# Patient Record
Sex: FEMALE | Race: WHITE | Employment: OTHER | URBAN - METROPOLITAN AREA
[De-identification: names, ages, dates, MRNs, and addresses within clinical notes are randomized per-mention and may not be internally consistent; named-entity substitution may affect disease eponyms.]

---

## 2017-01-11 ENCOUNTER — APPOINTMENT (OUTPATIENT)
Dept: RADIATION ONCOLOGY | Facility: HOSPITAL | Age: 77
End: 2017-01-11
Payer: MEDICARE

## 2017-10-01 ENCOUNTER — HOSPITAL ENCOUNTER (EMERGENCY)
Facility: HOSPITAL | Age: 77
Discharge: HOME | End: 2017-10-01
Attending: EMERGENCY MEDICINE
Payer: MEDICARE

## 2017-10-01 VITALS
BODY MASS INDEX: 23.36 KG/M2 | SYSTOLIC BLOOD PRESSURE: 128 MMHG | HEIGHT: 60 IN | TEMPERATURE: 98 F | RESPIRATION RATE: 16 BRPM | WEIGHT: 119 LBS | HEART RATE: 62 BPM | DIASTOLIC BLOOD PRESSURE: 74 MMHG | OXYGEN SATURATION: 97 %

## 2017-10-01 DIAGNOSIS — R04.0 NOSEBLEED: Primary | ICD-10-CM

## 2017-10-01 PROCEDURE — 99282 EMERGENCY DEPT VISIT SF MDM: CPT

## 2017-10-01 PROCEDURE — 99281 EMR DPT VST MAYX REQ PHY/QHP: CPT

## 2017-10-01 RX ORDER — PRAVASTATIN SODIUM 10 MG
10 TABLET ORAL
COMMUNITY
End: 2017-11-05

## 2017-10-01 NOTE — ED INITIAL ASSESSMENT (HPI)
States nose bleed at times after eating,  Last episode this am,  Previous about 3 weeks ago,  Bleeding from bilat nares

## 2017-10-01 NOTE — ED PROVIDER NOTES
Patient Seen in: [facility] Emergency Department    History   Patient presents with:  Nose Bleed (nasopharyngeal)    Stated Complaint: nose bleeds    HPI    50-year-old female presents to the emergency department complaining of having intermittent n alert and oriented ×3  HEENT: Pupils are equal reactive to light. Extra ocular motions are intact. No scleral icterus or conjunctival pallor: Neck is supple without tenderness on palpation. Head is atraumatic normocephalic.   Oral mucosa moist.  Tongue i days        Medications Prescribed:  Current Discharge Medication List

## 2017-10-26 ENCOUNTER — HOSPITAL ENCOUNTER (INPATIENT)
Facility: HOSPITAL | Age: 77
LOS: 10 days | Discharge: NURSING, REHAB OR LONG-TERM CARE | DRG: 300 | End: 2017-11-05
Attending: EMERGENCY MEDICINE | Admitting: INTERNAL MEDICINE
Payer: MEDICARE

## 2017-10-26 ENCOUNTER — APPOINTMENT (OUTPATIENT)
Dept: ULTRASOUND IMAGING | Facility: HOSPITAL | Age: 77
DRG: 300 | End: 2017-10-26
Attending: EMERGENCY MEDICINE
Payer: MEDICARE

## 2017-10-26 ENCOUNTER — APPOINTMENT (OUTPATIENT)
Dept: GENERAL RADIOLOGY | Facility: HOSPITAL | Age: 77
DRG: 300 | End: 2017-10-26
Attending: EMERGENCY MEDICINE
Payer: MEDICARE

## 2017-10-26 DIAGNOSIS — L03.115 CELLULITIS OF RIGHT LOWER EXTREMITY: Primary | ICD-10-CM

## 2017-10-26 DIAGNOSIS — I96 TOE GANGRENE (HCC): ICD-10-CM

## 2017-10-26 PROBLEM — I73.9 PVD (PERIPHERAL VASCULAR DISEASE) (HCC): Chronic | Status: ACTIVE | Noted: 2017-10-26

## 2017-10-26 PROBLEM — I10 ESSENTIAL HYPERTENSION: Chronic | Status: ACTIVE | Noted: 2017-10-26

## 2017-10-26 PROCEDURE — 93926 LOWER EXTREMITY STUDY: CPT | Performed by: EMERGENCY MEDICINE

## 2017-10-26 PROCEDURE — 99223 1ST HOSP IP/OBS HIGH 75: CPT | Performed by: INTERNAL MEDICINE

## 2017-10-26 PROCEDURE — 73630 X-RAY EXAM OF FOOT: CPT | Performed by: EMERGENCY MEDICINE

## 2017-10-26 RX ORDER — HYDROCODONE BITARTRATE AND ACETAMINOPHEN 5; 325 MG/1; MG/1
2 TABLET ORAL EVERY 4 HOURS PRN
Status: DISCONTINUED | OUTPATIENT
Start: 2017-10-26 | End: 2017-11-05

## 2017-10-26 RX ORDER — ATENOLOL 50 MG/1
50 TABLET ORAL 2 TIMES DAILY
Status: DISCONTINUED | OUTPATIENT
Start: 2017-10-26 | End: 2017-10-26

## 2017-10-26 RX ORDER — HYDROMORPHONE HYDROCHLORIDE 1 MG/ML
0.2 INJECTION, SOLUTION INTRAMUSCULAR; INTRAVENOUS; SUBCUTANEOUS EVERY 2 HOUR PRN
Status: DISCONTINUED | OUTPATIENT
Start: 2017-10-26 | End: 2017-11-05

## 2017-10-26 RX ORDER — ASPIRIN 81 MG/1
81 TABLET, CHEWABLE ORAL DAILY
Status: DISCONTINUED | OUTPATIENT
Start: 2017-10-26 | End: 2017-11-05

## 2017-10-26 RX ORDER — ATENOLOL 50 MG/1
50 TABLET ORAL 2 TIMES DAILY
Status: DISCONTINUED | OUTPATIENT
Start: 2017-10-27 | End: 2017-10-27

## 2017-10-26 RX ORDER — GABAPENTIN 600 MG/1
600 TABLET ORAL 2 TIMES DAILY
Status: DISCONTINUED | OUTPATIENT
Start: 2017-10-26 | End: 2017-11-05

## 2017-10-26 RX ORDER — MELATONIN
325
Status: DISCONTINUED | OUTPATIENT
Start: 2017-10-27 | End: 2017-11-05

## 2017-10-26 RX ORDER — HEPARIN SODIUM 5000 [USP'U]/ML
5000 INJECTION, SOLUTION INTRAVENOUS; SUBCUTANEOUS EVERY 8 HOURS SCHEDULED
Status: DISCONTINUED | OUTPATIENT
Start: 2017-10-26 | End: 2017-10-27

## 2017-10-26 RX ORDER — HYDROMORPHONE HYDROCHLORIDE 1 MG/ML
0.8 INJECTION, SOLUTION INTRAMUSCULAR; INTRAVENOUS; SUBCUTANEOUS EVERY 2 HOUR PRN
Status: DISCONTINUED | OUTPATIENT
Start: 2017-10-26 | End: 2017-11-05

## 2017-10-26 RX ORDER — ACETAMINOPHEN 325 MG/1
650 TABLET ORAL EVERY 4 HOURS PRN
Status: DISCONTINUED | OUTPATIENT
Start: 2017-10-26 | End: 2017-11-05

## 2017-10-26 RX ORDER — HYDROCODONE BITARTRATE AND ACETAMINOPHEN 5; 325 MG/1; MG/1
1 TABLET ORAL EVERY 4 HOURS PRN
Status: DISCONTINUED | OUTPATIENT
Start: 2017-10-26 | End: 2017-11-05

## 2017-10-26 RX ORDER — AMLODIPINE BESYLATE 5 MG/1
10 TABLET ORAL DAILY
Status: DISCONTINUED | OUTPATIENT
Start: 2017-10-26 | End: 2017-10-31

## 2017-10-26 RX ORDER — SENNA AND DOCUSATE SODIUM 50; 8.6 MG/1; MG/1
2 TABLET, FILM COATED ORAL DAILY PRN
Status: DISCONTINUED | OUTPATIENT
Start: 2017-10-26 | End: 2017-11-05

## 2017-10-26 RX ORDER — PANTOPRAZOLE SODIUM 20 MG/1
20 TABLET, DELAYED RELEASE ORAL
Status: DISCONTINUED | OUTPATIENT
Start: 2017-10-27 | End: 2017-11-05

## 2017-10-26 RX ORDER — HYDROMORPHONE HYDROCHLORIDE 1 MG/ML
0.4 INJECTION, SOLUTION INTRAMUSCULAR; INTRAVENOUS; SUBCUTANEOUS EVERY 2 HOUR PRN
Status: DISCONTINUED | OUTPATIENT
Start: 2017-10-26 | End: 2017-11-05

## 2017-10-26 RX ORDER — ONDANSETRON 2 MG/ML
4 INJECTION INTRAMUSCULAR; INTRAVENOUS EVERY 6 HOURS PRN
Status: DISCONTINUED | OUTPATIENT
Start: 2017-10-26 | End: 2017-11-05

## 2017-10-26 RX ORDER — CHOLESTYRAMINE LIGHT 4 G/5.7G
4 POWDER, FOR SUSPENSION ORAL 2 TIMES DAILY
Status: DISCONTINUED | OUTPATIENT
Start: 2017-10-26 | End: 2017-11-05

## 2017-10-26 RX ORDER — PENTOXIFYLLINE 400 MG/1
400 TABLET, EXTENDED RELEASE ORAL
Status: DISCONTINUED | OUTPATIENT
Start: 2017-10-26 | End: 2017-11-05

## 2017-10-26 RX ORDER — SODIUM CHLORIDE 9 MG/ML
INJECTION, SOLUTION INTRAVENOUS CONTINUOUS
Status: DISCONTINUED | OUTPATIENT
Start: 2017-10-26 | End: 2017-10-27

## 2017-10-26 RX ORDER — PRAVASTATIN SODIUM 10 MG
10 TABLET ORAL
Status: DISCONTINUED | OUTPATIENT
Start: 2017-10-27 | End: 2017-11-05

## 2017-10-26 RX ORDER — ATORVASTATIN CALCIUM 10 MG/1
10 TABLET, FILM COATED ORAL NIGHTLY
Status: DISCONTINUED | OUTPATIENT
Start: 2017-10-26 | End: 2017-10-26

## 2017-10-26 NOTE — ED NOTES
Patient assisted to bedside commode and back into bed. Right foot/leg elevated for comfort. Remains updated with plan of care. Waiting for US at this time.

## 2017-10-26 NOTE — PROGRESS NOTES
NURSING ADMISSION NOTE      Patient admitted via Cart  Oriented to room. Safety precautions initiated. Bed in low position. Call light in reach. Dr. Bryce De Dios paged for orders.

## 2017-10-26 NOTE — H&P
[facility]                                                               History & Physical         Karlee Lyon Patient Status:  Inpatient    1940 MRN [id number]   [facility] 3NW-A Attending Tanner Torres MD   [facility] Day # 0 Lisinopril                Plavix [Clopidogrel]        Home Medications:    Prescriptions Prior to Admission:  Pravastatin Sodium 10 MG Oral Tab Take 10 mg by mouth nightly. Disp:  Rfl:     aspirin 81 MG Oral Tab Take 81 mg by mouth daily.  Disp:  Rfl: in the the HPI. Physical Exam:     Vital signs: Blood pressure 95/75, pulse 69, temperature (!) 97.5 °F (36.4 °C), temperature source Temporal, resp. rate 16, height 4' 8\" (1.422 m), weight 117 lb (53.1 kg), SpO2 95 %. General: No acute distress.  Boogie color duplex Doppler ultrasound examination of the right lower extremity was performed as well as high-resolution ultrasound examination of the abdominal aorta.   Color imaging, Doppler wave form analysis, and peak systolic   flow measurements of the common (CPT=73630)     TECHNIQUE:  AP, oblique, and lateral views were obtained. COMPARISON:  None. INDICATIONS:  swelling     PATIENT STATED HISTORY: (As transcribed by Technologist)  Patient is non-diabetic with no prior surgeries and trauma.  She has co will require inpatient services that will reasonably be expected to span two midnight's based on the clinical documentation in H+P. Based on patients current state of illness, I anticipate that, after discharge, patient will require TBD.       Discussed i

## 2017-10-26 NOTE — ED NOTES
Patient waiting for xray at this time. Plan for [address] following xray. Patient remains updated with plan of care.

## 2017-10-26 NOTE — CONSULTS
INFECTIOUS DISEASE CONSULTATION    Sesar Maddoxerty Patient Status:  Inpatient    1940 MRN [id number]   [facility] 3NW-A Attending Ramon Murray MD   [facility] Day # 0 JULISSA lEias, tablet, 1 tablet, Oral, Q4H PRN **OR** HYDROcodone-acetaminophen (NORCO) 5-325 MG per tab 2 tablet, 2 tablet, Oral, Q4H PRN  •  ondansetron HCl (ZOFRAN) injection 4 mg, 4 mg, Intravenous, Q6H PRN  •  AmLODIPine Besylate (NORVASC) tab 10 mg, 10 mg, Oral, Da RDW  15.6   NEPRELIM  8.72*   WBC  11.8   PLT  254.0       Recent Labs   Lab  10/26/17   1027   GLU  101*   BUN  20   CREATSERUM  1.65*   CA  8.6   ALB  2.8*   NA  139   K  4.4   CL  117*   CO2  11.0*   ALKPHO  103   AST  21   ALT  15   BILT  0.2   TP  7

## 2017-10-26 NOTE — ED PROVIDER NOTES
Patient Seen in: [facility] Emergency Department    History   Patient presents with:  Swelling Edema (cardiovascular, metabolic)    Stated Complaint: swelling    HPI    50-year-old female who currently resides at [facility] due to inability t gangrenous right little toe. There is erythema, edema and induration to the dorsum of her foot. Onychomycosis. No lymphangitic streaking.   Slight purulence around the base of the third toe    ED Course     Labs Reviewed   COMP METABOLIC PANEL (14) - Abn visit. Follow-up:  No follow-up provider specified.     Medications Prescribed:  Current Discharge Medication List

## 2017-10-26 NOTE — ED INITIAL ASSESSMENT (HPI)
Patient reports right sided foot pain/swelling. Pain mainly to toes, most severe to 4th and 5th. Denies any injury/trauma. NH staff reports neg xray of foot.  Taking norco as needed for pain, last dose at 930 am pta

## 2017-10-26 NOTE — ED NOTES
Patient taken to [address] [facility] department at this time by PCT. Remains updated with plan for admission. Blood culture sets drawn. Waiting for antibiotic to be available for infusion.

## 2017-10-26 NOTE — ED NOTES
Patient remains out of department in [address] [facility] at this time for arterial study.

## 2017-10-27 ENCOUNTER — APPOINTMENT (OUTPATIENT)
Dept: ULTRASOUND IMAGING | Facility: HOSPITAL | Age: 77
DRG: 300 | End: 2017-10-27
Attending: INTERNAL MEDICINE
Payer: MEDICARE

## 2017-10-27 PROCEDURE — 76770 US EXAM ABDO BACK WALL COMP: CPT | Performed by: INTERNAL MEDICINE

## 2017-10-27 PROCEDURE — 99222 1ST HOSP IP/OBS MODERATE 55: CPT | Performed by: INTERNAL MEDICINE

## 2017-10-27 PROCEDURE — 99232 SBSQ HOSP IP/OBS MODERATE 35: CPT | Performed by: INTERNAL MEDICINE

## 2017-10-27 RX ORDER — POTASSIUM CHLORIDE 20 MEQ/1
40 TABLET, EXTENDED RELEASE ORAL ONCE
Status: COMPLETED | OUTPATIENT
Start: 2017-10-27 | End: 2017-10-27

## 2017-10-27 RX ORDER — HEPARIN SODIUM AND DEXTROSE 10000; 5 [USP'U]/100ML; G/100ML
12 INJECTION INTRAVENOUS ONCE
Status: COMPLETED | OUTPATIENT
Start: 2017-10-27 | End: 2017-10-27

## 2017-10-27 RX ORDER — HEPARIN SODIUM 5000 [USP'U]/ML
60 INJECTION INTRAVENOUS; SUBCUTANEOUS ONCE
Status: COMPLETED | OUTPATIENT
Start: 2017-10-27 | End: 2017-10-27

## 2017-10-27 RX ORDER — HEPARIN SODIUM AND DEXTROSE 10000; 5 [USP'U]/100ML; G/100ML
INJECTION INTRAVENOUS CONTINUOUS
Status: DISCONTINUED | OUTPATIENT
Start: 2017-10-28 | End: 2017-10-30

## 2017-10-27 NOTE — PHYSICAL THERAPY NOTE
PHYSICAL THERAPY QUICK EVALUATION - INPATIENT    Room Number: 327/327-A  Evaluation Date: 10/27/2017  Presenting Problem: R 5th toe gangrene and RLE cellulitis  Physician Order: PT Eval and Treat    Problem List  Principal Problem:    Cellulitis of right MOBILITY  How much difficulty does the patient currently have. ..  -   Turning over in bed (including adjusting bedclothes, sheets and blankets)?: None   -   Sitting down on and standing up from a chair with arms (e.g., wheelchair, bedside commode, etc.): A from moderate. Based on this evaluation, patient's clinical presentation is stable and overall evaluation complexity is considered low. Pt is currently at her baseline for mobility and has been non ambulatory x 6 yrs.  Pt is safe to perform bed mobility and

## 2017-10-27 NOTE — PLAN OF CARE
PAIN - ADULT    • Verbalizes/displays adequate comfort level or patient's stated pain goal Progressing        SKIN/TISSUE INTEGRITY - ADULT    • Incision(s), wounds(s) or drain site(s) healing without S/S of infection Progressing          Patient complaini

## 2017-10-27 NOTE — PROGRESS NOTES
NOTIFIED CARDIOLOGY CONSULTATION  THIS MORNING, NOBODY CALLED BACK , FOLLOW UP WITH CONSULTATION, DISCUSSED WITH APBOONE , SHE SAID  THEY ARE BUSY WITH PROCEDURES WILL SEE PT LATER , WILL CONTINUE TO MONITOR

## 2017-10-27 NOTE — PROGRESS NOTES
Patient resting in bed calmly. Right LE elevated on pillow and note right foot red and warm and swollen. Note 5 th toe black on right foot. All consults contacted and informed of orders.

## 2017-10-27 NOTE — OCCUPATIONAL THERAPY NOTE
OCCUPATIONAL THERAPY QUICK EVALUATION - INPATIENT    Room Number: 327/327-A  Evaluation Date: 10/27/2017     Type of Evaluation: Initial and Quick Eval  Presenting Problem: R 5th toe gangrene    Physician Order: IP Consult to Occupational Therapy  Reason f STRENGTH ASSESSMENT  Upper extremity ROM is within functional limits     Upper extremity strength is within functional limits     NEUROLOGICAL FINDINGS                   ACTIVITIES OF DAILY LIVING ASSESSMENT  AM-PAC ‘6-Clicks’ Inpatient Daily Activity Kirk Clock No comorbidities nor modifications of tasks    Clinical Decision Making  LOW - Analysis of occupational profile, problem-focused assessments, limited treatment options    Overall Complexity  LOW     OT Discharge Recommendations: Long term care       PLAN

## 2017-10-27 NOTE — CONSULTS
[facility]    Report of Consultation    Carol Ann Schultz Patient Status:  Inpatient    1940 MRN [id number]   [facility] 3NW-A Attending Anisa Agee MD   [facility] Day # 1 PCP Kathleen Knox MD     Date of Admission:  10/26/2017 mEq, Intravenous, Continuous  •  Heparin Sodium (Porcine) 5000 UNIT/ML injection 5,000 Units, 5,000 Units, Subcutaneous, Q8H SAE  •  HYDROmorphone HCl PF (DILAUDID) 1 MG/ML injection 0.2 mg, 0.2 mg, Intravenous, Q2H PRN **OR** HYDROmorphone HCl PF (DILAUDI not on telemetry  General: Alert and oriented in no apparent distress. HEENT: No focal deficits. Neck: No JVD  Cardiac: Regular rate and rhythm, FRANK  Lungs: Clear without wheezes, rales, rhonchi or dullness. Normal excursions and effort. Hyperinflated. Gerson Diaz  10/27/2017  5:34 PM

## 2017-10-27 NOTE — CONSULTS
[facility]  Report of Consultation    Etelvina Santana Patient Status:  Inpatient    1940 MRN [id number]   [facility] 3NW-A Attending Malik Lundy MD   [facility] Day # 1 PCP Mario Haider MD     Date of Admission:  10/26/2017  Date Intravenous, Q2H PRN  •  acetaminophen (TYLENOL) tab 650 mg, 650 mg, Oral, Q4H PRN **OR** HYDROcodone-acetaminophen (NORCO) 5-325 MG per tab 1 tablet, 1 tablet, Oral, Q4H PRN **OR** HYDROcodone-acetaminophen (NORCO) 5-325 MG per tab 2 tablet, 2 tablet, Jeromy Patch common femoral   artery. A CTA could be obtained to assess the iliac arteries as clinically appropriate. Vascular surgery consultation is recommended.          Dictated by: Laney Riggs MD on 10/26/2017 at 13:09       Approved by: Laney Riggs MD

## 2017-10-27 NOTE — CM/SW NOTE
MSW reviewed chart for d/c needs. MSW spoke to Admissions at [facility], patient is long term care there under [address]. POA-HC form in EMR for son Jazmine Ma.

## 2017-10-27 NOTE — CONSULTS
[facility]  Report of Consultation    Ligia Oliver Patient Status:  Inpatient    1940 MRN [id number]   [facility] 3NW-A Attending Summer Hightower MD   Hosp Day # 1 PCP Ole Camejo MD     Reason for Consultation:  CKD 3    H PRN  •  acetaminophen (TYLENOL) tab 650 mg, 650 mg, Oral, Q4H PRN **OR** HYDROcodone-acetaminophen (NORCO) 5-325 MG per tab 1 tablet, 1 tablet, Oral, Q4H PRN **OR** HYDROcodone-acetaminophen (NORCO) 5-325 MG per tab 2 tablet, 2 tablet, Oral, Q4H PRN  •  on scleral icterus, MMM  Neck: Supple, no COURTNEY or thyromegaly  Cardiac: Regular rate and rhythm, S1, S2 normal, 1/6 FRANK  Lungs: Clear without wheezes, rales, rhonchi.     Abdomen: Soft, non-tender. + bowel sounds, no palpable organomegaly  Extremities:  R foot 1-5% or so at most.  Will follow    #2. R 5th toe gangrene- will need vascular eval    #3. HTN- cont amlodipine    #4. Metabolic acidosis- may be due to ischemia/tissue damage.   will change to bicarb gtt        Thank you for allowing me to participate i

## 2017-10-27 NOTE — PLAN OF CARE
Per  patient follows Evelin Hu from [facility]iology, Betty Vazquez notified, order received to consult [facility]. Bette Jimenez on call and paged, will await response.

## 2017-10-27 NOTE — PROGRESS NOTES
[facility]  Progress Note    Yamila Self Patient Status:  Inpatient    1940 MRN [id number]   [facility] 3NW-A Attending Tasia Fleming MD   Hosp Day # 1 PCP Brittany Slade MD     CC:  Intractable right foot swelling and pain cellulitis of the right foot with erythema and mild swelling. Right foot toes itself is colder than the rest of the body to touch. Exam same as yesterday  Psychiatric: Appropriate mood and affect.       Labs:     Lab Results  Component Value Date   WBC 8.1 tablet 2 tablet Oral Daily PRN   CeFAZolin in isotonic solution (ANCEF) 1 GM/50ML IVPB premix 1 g 1 g Intravenous Q12H   atenolol (TENORMIN) tab 50 mg 50 mg Oral BID       ASSESSMENT / PLAN:     1.  Right foot fifth toe gangrene with right foot cellulitis w

## 2017-10-28 PROCEDURE — 99232 SBSQ HOSP IP/OBS MODERATE 35: CPT | Performed by: INTERNAL MEDICINE

## 2017-10-28 RX ORDER — CHLORHEXIDINE GLUCONATE 4 G/100ML
30 SOLUTION TOPICAL DAILY
Status: DISCONTINUED | OUTPATIENT
Start: 2017-10-28 | End: 2017-11-03

## 2017-10-28 NOTE — CONSULTS
[facility]    PATIENT'S NAME: Karine Salazar   ATTENDING PHYSICIAN: Austyn Hurtado M.D.   CONSULTING PHYSICIAN: Radha Potter M.D.    PATIENT ACCOUNT#:   [de-identified]    LOCATION:  [address] [place] [address]  MEDICAL RECORD #:   [id number]       DATE OF BIRTH:  [birth year] Her labs show her creatinine 1.66, BUN 20. Her last white count was 11,000, hemoglobin was 9.6, platelet count is 277,759. She has blood flow study showing significant disease of the lower extremity.  Monophasic flow seen all the way distally from the f

## 2017-10-28 NOTE — PROGRESS NOTES
[facility]  Progress Note    Ligia Oliver Patient Status:  Inpatient    1940 MRN [id number]   [facility] 3NW-A Attending Summer Hightower MD   Hosp Day # 2 PCP Ole Camejo MD     CC:  Intractable right foot swelling and pain yesterday. Integument: Right foot fifth toe wet gangrene with surrounding cellulitis of the right foot with erythema and mild swelling. Right foot toes itself is colder than the rest of the body to touch. 2 nd toe tip discolored with decreased blanching breakfast   gabapentin (NEURONTIN) tab 600 mg 600 mg Oral BID   Pantoprazole Sodium (PROTONIX) EC tab 20 mg 20 mg Oral QAM AC   Pentoxifylline ER (TRENTAL) CR tab 400 mg 400 mg Oral TID CC   Pravastatin Sodium (PRAVACHOL) tab 10 mg 10 mg Oral Once per day

## 2017-10-28 NOTE — PROGRESS NOTES
[facility]                INFECTIOUS DISEASE PROGRESS NOTE    Deepika Rob Patient Status:  Inpatient    1940 MRN [id number]   [facility] 3NW-A Attending Stephen Chaudhry MD   Hosp Day # 2 PCP MD Belem [place] Culture Result No Growth 1 Day   Blood Culture FREQ X 2 [[id number]] Collected: 10/26/17 1140   Order Status: Completed Lab Status: Preliminary result Updated: 10/27/17 1200   Specimen: Blood from Blood,peripheral     Blood Culture Result No Growth 1 Day

## 2017-10-28 NOTE — PROGRESS NOTES
[facility]  Nephrology Progress Note    Gretta Hinojosa Attending:  Cynthia Ziegler MD       Assessment and Plan:    1) CKD 3- slow progression of renal dysfunction is due to previously \"undiagnosed\" PKD +/- hypertensive and age-related nephroscleros CO2 18.0 10/28/2017    10/28/2017   CA 7.5 10/28/2017   PTT 78.5 10/28/2017   MG 2.2 10/28/2017       Imaging: All imaging studies reviewed.     Meds:     Current Facility-Administered Medications:  mupirocin (BACTROBAN) 2% nasal ointment OINT 1 A PM

## 2017-10-28 NOTE — PLAN OF CARE
Incision(s), wounds(s) or drain site(s) healing without S/S of infection Not Progressing      Achieve highest/safest level of mobility/gait Progressing      Verbalizes/displays adequate comfort level or patient's stated pain goal Progressing      Patient/F

## 2017-10-29 ENCOUNTER — APPOINTMENT (OUTPATIENT)
Dept: CV DIAGNOSTICS | Facility: HOSPITAL | Age: 77
DRG: 300 | End: 2017-10-29
Attending: INTERNAL MEDICINE
Payer: MEDICARE

## 2017-10-29 PROCEDURE — 93306 TTE W/DOPPLER COMPLETE: CPT | Performed by: INTERNAL MEDICINE

## 2017-10-29 PROCEDURE — 99232 SBSQ HOSP IP/OBS MODERATE 35: CPT | Performed by: INTERNAL MEDICINE

## 2017-10-29 RX ORDER — SODIUM CHLORIDE 9 MG/ML
INJECTION, SOLUTION INTRAVENOUS CONTINUOUS
Status: DISCONTINUED | OUTPATIENT
Start: 2017-10-29 | End: 2017-10-31

## 2017-10-29 RX ORDER — PENTOXIFYLLINE 400 MG/1
400 TABLET, EXTENDED RELEASE ORAL
Status: COMPLETED | OUTPATIENT
Start: 2017-10-29 | End: 2017-10-29

## 2017-10-29 NOTE — PROGRESS NOTES
· [facility] Cardiology Progress Note     Subjective:  Pain in right foot, no chest pain or dyspnea    Objective:  SBP 90s  SB/SR  I/O + 2027  BUN/cr 14/1.25 10/28  plt 230K    Cardiac:S1 s2 regular  Lungs: clear decreased BS  Abdomen:soft  Extremities:n

## 2017-10-29 NOTE — PROGRESS NOTES
[facility]  Progress Note    Moreno Baumann Patient Status:  Inpatient    1940 MRN [id number]   [facility] 3NW-A Attending Darrell Morales MD   Hosp Day # 3 PCP Parris Dubin, MD     CC:  Intractable right foot swelling and pain popliteal pulse present,same as yesterday. Intractable right foot  discolored bluish black fifth toe same as yesterday, 2 nd toe tip discolored with decreased blanching same, foot and toes with less erythema, more pink in color with better capillary refill Daily   cholestyramine light (PREVALITE) powder packet 4 g 4 g Oral BID   ferrous sulfate EC tab 325 mg 325 mg Oral Daily with breakfast   gabapentin (NEURONTIN) tab 600 mg 600 mg Oral BID   Pantoprazole Sodium (PROTONIX) EC tab 20 mg 20 mg Oral QAM AC   P

## 2017-10-29 NOTE — PROGRESS NOTES
Fifth toe on right foot black and painful. Second toe on right foot red this AM, when reassessed in the afternoon tip of the toe is starting to turn black. BP dropped to 89/61, HR 48. Pt denies any shortness of breath, chest pain, or lightheadedness.  Al

## 2017-10-30 ENCOUNTER — APPOINTMENT (OUTPATIENT)
Dept: INTERVENTIONAL RADIOLOGY/VASCULAR | Facility: HOSPITAL | Age: 77
DRG: 300 | End: 2017-10-30
Attending: INTERNAL MEDICINE
Payer: MEDICARE

## 2017-10-30 PROCEDURE — 99232 SBSQ HOSP IP/OBS MODERATE 35: CPT | Performed by: INTERNAL MEDICINE

## 2017-10-30 PROCEDURE — 04JY3ZZ INSPECTION OF LOWER ARTERY, PERCUTANEOUS APPROACH: ICD-10-PCS | Performed by: INTERNAL MEDICINE

## 2017-10-30 RX ORDER — LIDOCAINE HYDROCHLORIDE 10 MG/ML
INJECTION, SOLUTION INFILTRATION; PERINEURAL
Status: COMPLETED
Start: 2017-10-30 | End: 2017-10-30

## 2017-10-30 RX ORDER — POTASSIUM CHLORIDE 20 MEQ/1
40 TABLET, EXTENDED RELEASE ORAL EVERY 4 HOURS
Status: COMPLETED | OUTPATIENT
Start: 2017-10-30 | End: 2017-10-30

## 2017-10-30 RX ORDER — SODIUM CHLORIDE 9 MG/ML
INJECTION, SOLUTION INTRAVENOUS CONTINUOUS
Status: ACTIVE | OUTPATIENT
Start: 2017-10-30 | End: 2017-10-30

## 2017-10-30 RX ORDER — MIDAZOLAM HYDROCHLORIDE 1 MG/ML
INJECTION INTRAMUSCULAR; INTRAVENOUS
Status: DISCONTINUED
Start: 2017-10-30 | End: 2017-10-30 | Stop reason: WASHOUT

## 2017-10-30 RX ORDER — MIDAZOLAM HYDROCHLORIDE 1 MG/ML
INJECTION INTRAMUSCULAR; INTRAVENOUS
Status: COMPLETED
Start: 2017-10-30 | End: 2017-10-30

## 2017-10-30 RX ORDER — HEPARIN SODIUM 5000 [USP'U]/ML
INJECTION, SOLUTION INTRAVENOUS; SUBCUTANEOUS
Status: COMPLETED
Start: 2017-10-30 | End: 2017-10-30

## 2017-10-30 NOTE — CM/SW NOTE
10/30/17 1100   CM/SW Referral Data   Referral Source Physician;Social Work (self-referral)   Reason for Referral Discharge planning   Informant Patient; Children   Pertinent Medical Hx   Primary Care Physician Name Dot Carmen 60   Patient Info   Patient's Ment

## 2017-10-30 NOTE — PROGRESS NOTES
Patient left the unit at this time in stable condition to the cath lab. Consent is on chart, unsigned per patient request, she still has some questions. IV fluids infusing, bag of   .9% NS with tubing primed hanging on IV pole.  Heparin gtt infusing, new ba

## 2017-10-30 NOTE — PROGRESS NOTES
[facility]  Nephrology Progress Note    Luke Bowens Patient Status:  Inpatient    1940 MRN [id number]   [facility] 3NW-A Attending Mathew Strong MD   Hosp Day # 4 PCP Senthil Jo MD       SUBJECTIVE:  No complaints today last 72 hours.     Meds:     Current Facility-Administered Medications:  0.9%  NaCl infusion  Intravenous Continuous   mupirocin (BACTROBAN) 2% nasal ointment OINT 1 Application 1 Application Each Nare BID   Chlorhexidine Gluconate (HIBICLENS) 4 % liquid 30 today    #3. Hypokalemia- replace    #4. HTN- BP stable    #5. Met acidosis-due to ischemia in setting of CKD. Stable      Questions/concerns were discussed with patient and/or family by bedside.           Patricia Saucedo  10/30/2017  8:29 AM

## 2017-10-30 NOTE — PROGRESS NOTES
[facility]  Progress Note    Luis E Valverde Patient Status:  Inpatient    1940 MRN [id number]   [facility] 3NW-A Attending Nikolay Borden MD   Hosp Day # 4 PCP Shayy Sol MD     CC:  Intractable right foot swelling and pain pulse present,same as yesterday. Intractable right foot  discolored bluish black fifth toe same as yesterday, 2 nd toe tip discolored with decreased blanching same, foot and toes with less erythema, more pink in color with better capillary refill seen in bi Oral Q4H PRN   Or      HYDROcodone-acetaminophen (NORCO) 5-325 MG per tab 2 tablet 2 tablet Oral Q4H PRN   ondansetron HCl (ZOFRAN) injection 4 mg 4 mg Intravenous Q6H PRN   AmLODIPine Besylate (NORVASC) tab 10 mg 10 mg Oral Daily   aspirin chewable tab 81 TBD      Questions/concerns and Plan of care were discussed with patient ; Had discussed with son at bedside.             Tahir Lopes MD  10/30/2017

## 2017-10-30 NOTE — PROGRESS NOTES
Paged Dr. Joni Garcia regarding potassium level of 2.8. Awaiting call back. Will continue to monitor. Spoke with Dr. Drew Zhou, he states he will order the electrolyte protocol.

## 2017-10-30 NOTE — PROGRESS NOTES
10/30/17  0836   BP: 101/73   Pulse: 67   Resp: 18   Temp: 97.7 °F (36.5 °C)   Patient was seen resting comfortably in bed her son Pablo Richards was present. Her last temperature shows 98.9 she is stable she is afebrile.     Objective:  The patient's feet were exam

## 2017-10-30 NOTE — PROGRESS NOTES
[facility]                INFECTIOUS DISEASE PROGRESS NOTE    Travis Skyler Patient Status:  Inpatient    1940 MRN [id number]   [facility] 3NW-A Attending Dwayne Aj MD   Hosp Day # 4 PCP MD Funmilayo Baxter 10/26/17 1140   Order Status: Completed Lab Status: Preliminary result Updated: 10/29/17 1200   Specimen: Blood from Blood,peripheral     Blood Culture Result No Growth 3 Days   MRSA Culture Only Once [[id number]] (Abnormal) Collected: 10/26/17 1630   Order

## 2017-10-30 NOTE — PROCEDURES
[facility]    PATIENT'S NAME: Karine Salazar   ATTENDING PHYSICIAN: Austyn Hurtado M.D. OPERATING PHYSICIAN: Prasanth Hernández M.D.    PATIENT ACCOUNT#:   [de-identified]    LOCATION:  [address] [place] [address]  MEDICAL RECORD #:   [id number]       DATE OF BIRTH:

## 2017-10-31 ENCOUNTER — APPOINTMENT (OUTPATIENT)
Dept: CT IMAGING | Facility: HOSPITAL | Age: 77
DRG: 300 | End: 2017-10-31
Attending: INTERNAL MEDICINE
Payer: MEDICARE

## 2017-10-31 PROCEDURE — 5A2204Z RESTORATION OF CARDIAC RHYTHM, SINGLE: ICD-10-PCS | Performed by: INTERNAL MEDICINE

## 2017-10-31 PROCEDURE — 75635 CT ANGIO ABDOMINAL ARTERIES: CPT | Performed by: INTERNAL MEDICINE

## 2017-10-31 PROCEDURE — 99232 SBSQ HOSP IP/OBS MODERATE 35: CPT | Performed by: INTERNAL MEDICINE

## 2017-10-31 RX ORDER — HYDRALAZINE HYDROCHLORIDE 20 MG/ML
10 INJECTION INTRAMUSCULAR; INTRAVENOUS EVERY 6 HOURS PRN
Status: DISCONTINUED | OUTPATIENT
Start: 2017-10-31 | End: 2017-11-05

## 2017-10-31 RX ORDER — ZOLPIDEM TARTRATE 5 MG/1
5 TABLET ORAL NIGHTLY PRN
Status: DISCONTINUED | OUTPATIENT
Start: 2017-10-31 | End: 2017-11-05

## 2017-10-31 RX ORDER — ONDANSETRON 2 MG/ML
4 INJECTION INTRAMUSCULAR; INTRAVENOUS EVERY 6 HOURS PRN
Status: DISCONTINUED | OUTPATIENT
Start: 2017-10-31 | End: 2017-10-31

## 2017-10-31 RX ORDER — AMLODIPINE BESYLATE 5 MG/1
5 TABLET ORAL DAILY
Status: DISCONTINUED | OUTPATIENT
Start: 2017-10-31 | End: 2017-11-02

## 2017-10-31 RX ORDER — POTASSIUM CHLORIDE 20 MEQ/1
40 TABLET, EXTENDED RELEASE ORAL EVERY 4 HOURS
Status: COMPLETED | OUTPATIENT
Start: 2017-10-31 | End: 2017-11-01

## 2017-10-31 NOTE — PLAN OF CARE
Assumed care at [phone]. Transferred from cath lab to [address] Unsuccessful angiogram for limb ischemia. Left femoral artery site covered with 2x2 and tegaderm. Site soft to touch, no hematoma. Flat for 1hr post transfer.  Patient voided 2x prior to elevating HOB

## 2017-10-31 NOTE — PROGRESS NOTES
No problems overnight. Seems generally miserable between chronic back pain and foot. Reviewed with Dr. Annie Schultz last evening. No bleeding from left groin overnight.     Right second toe actually looks better to my eye than last week -- less swelling and

## 2017-10-31 NOTE — PROGRESS NOTES
[facility]                INFECTIOUS DISEASE PROGRESS NOTE    Yamila Self Patient Status:  Inpatient    1940 MRN [id number]   [facility] 3NW-A Attending Tasia Fleming MD   Hosp Day # 5 PCP MD Joy Lyons Days   MRSA Culture Only Once [[id number]] (Abnormal) Collected: 10/26/17 1630   Order Status: Completed Lab Status: Final result Updated: 10/27/17 2225   Specimen: Other from Nares     Mrsa Culture 1+ growth Staphylococcus aureus, MRSA (A)         Problem

## 2017-10-31 NOTE — PROGRESS NOTES
[facility]  Progress Note    Adrianna Cope Patient Status:  Inpatient    1940 MRN [id number]   [facility] 3NW-A Attending Charlotte Malloy MD   Hosp Day # 5 PCP Julien Taveras MD     CC:  Intractable right foot swelling and pain pulse present,same as yesterday. Intractable right foot  discolored bluish black fifth toe same as yesterday, 2 nd toe tip discolored with decreased blanching same, foot and toes with less erythema, more pink in color with better capillary refill seen in bi There was moderate regurgitation. 8. Moderately elevated pulmonary pressures in high 40&apos;s mmHg. Impressions:  No previous study was available for comparison.   *      Addendum: CTA results were pending at the time and I saw patient and discussed with the right leg. There is three-vessel runoff to the right leg.      Left side:  Proximal left common iliac artery, distally, 60%  Left external iliac artery 100%, image 95 series 6 where there is a focal absence of blood flow in this vessel, but with reconst femoral head, chronic heterotopic bone around the joint and thickening of the joint space.  Old left pubic rami fractures.     =====  CONCLUSION:  Atherosclerotic stenoses bilaterally, including occlusions with reconstitution, including occlusion of the rig Sodium (PRAVACHOL) tab 10 mg 10 mg Oral Once per day on Mon Wed Fri   Senna-Docusate Sodium (SENOKOT S) 8.6-50 MG tab 2 tablet 2 tablet Oral Daily PRN   CeFAZolin in isotonic solution (ANCEF) 1 GM/50ML IVPB premix 1 g 1 g Intravenous Q12H       ASSESSMENT

## 2017-11-01 ENCOUNTER — APPOINTMENT (OUTPATIENT)
Dept: CT IMAGING | Facility: HOSPITAL | Age: 77
DRG: 300 | End: 2017-11-01
Attending: INTERNAL MEDICINE
Payer: MEDICARE

## 2017-11-01 PROCEDURE — 99232 SBSQ HOSP IP/OBS MODERATE 35: CPT | Performed by: HOSPITALIST

## 2017-11-01 PROCEDURE — 99232 SBSQ HOSP IP/OBS MODERATE 35: CPT | Performed by: INTERNAL MEDICINE

## 2017-11-01 PROCEDURE — 71250 CT THORAX DX C-: CPT | Performed by: INTERNAL MEDICINE

## 2017-11-01 RX ORDER — HEPARIN SODIUM AND DEXTROSE 10000; 5 [USP'U]/100ML; G/100ML
INJECTION INTRAVENOUS CONTINUOUS
Status: DISCONTINUED | OUTPATIENT
Start: 2017-11-01 | End: 2017-11-05

## 2017-11-01 RX ORDER — HEPARIN SODIUM 5000 [USP'U]/ML
60 INJECTION INTRAVENOUS; SUBCUTANEOUS ONCE
Status: COMPLETED | OUTPATIENT
Start: 2017-11-01 | End: 2017-11-01

## 2017-11-01 RX ORDER — HEPARIN SODIUM AND DEXTROSE 10000; 5 [USP'U]/100ML; G/100ML
12 INJECTION INTRAVENOUS ONCE
Status: COMPLETED | OUTPATIENT
Start: 2017-11-01 | End: 2017-11-01

## 2017-11-01 RX ORDER — MAGNESIUM OXIDE 400 MG (241.3 MG MAGNESIUM) TABLET
800 TABLET ONCE
Status: COMPLETED | OUTPATIENT
Start: 2017-11-01 | End: 2017-11-01

## 2017-11-01 NOTE — PROGRESS NOTES
Seen and examined. Reviewed with patient and her son Megan Pompa at the bedside. She is in fairly good spirits this am. Comfortable supine in bed. Foot not bothering her.     Tele with sinus mechanism and frequent PAC's  -- on IV amiodarone    Lungs clear  Ht RR

## 2017-11-01 NOTE — PLAN OF CARE
AT 18:20 PT HAD 30 BTS OF VTAC,  ASYMPTOMATIC /54  WHILE CALLING CARDIOLOGY PT THEN WENT INTO SVT HR >200  BP 60 SYS,. 0.9 NS WIDE OPEN RAPID RESPONSE INITIATED. PT CARDIOVERTED BACK INTO SR. AT BEDSIDE. AMIODARONE GTT STARTED AT 1930.  PTS BP REMAINS

## 2017-11-01 NOTE — PROGRESS NOTES
Called for rapid response for rapid heart rate up to 190s with low BP. ECG showed AF with RVR. IV Amiodarone and brevital with subsequent DCCV to SR. BP improved but remained low likely secondary to amiodarone. IVF given.  Pt remained asymptomatic, no chest

## 2017-11-01 NOTE — PLAN OF CARE
PAIN - ADULT    • Verbalizes/displays adequate comfort level or patient's stated pain goal Progressing        Patient/Family Goals    • Patient/Family Long Term Goal Progressing    • Patient/Family Short Term Goal Progressing          Patient is alert and

## 2017-11-01 NOTE — PROGRESS NOTES
[facility]  Nephrology Progress Note    Sesar Gini Patient Status:  Inpatient    1940 MRN [id number]   [facility] 3NW-A Attending Ramon Murray MD   Hosp Day # 6 PCP Vijaya Elias MD       SUBJECTIVE:  Notes/events review Recent Labs   Lab  10/31/17   1857   ALT  <6*   AST  22   ALB  2.3*       Recent Labs   Lab  10/31/17   1846   PGLU  130*       Meds:     Current Facility-Administered Medications:  magnesium sulfate IVPB premix 4 g 4 g Intravenous Once   Heparin Sod (PRAVACHOL) tab 10 mg 10 mg Oral Once per day on Mon Wed Fri   Senna-Docusate Sodium (SENOKOT S) 8.6-50 MG tab 2 tablet 2 tablet Oral Daily PRN   CeFAZolin in isotonic solution (ANCEF) 1 GM/50ML IVPB premix 1 g 1 g Intravenous Q12H         Impression/Plan:

## 2017-11-01 NOTE — CONSULTS
[facility]  Report of Consultation    Sesar Rubalcava Patient Status:  Inpatient    1940 MRN [id number]   [facility] 7NE-A Attending Edd Carl MD   Hosp Day # 6 PCP Vijaya Elias MD     Reason for Consultation: Nodule/s drugs.    Medications:    Prescriptions Prior to Admission:  Pravastatin Sodium 10 MG Oral Tab Take 10 mg by mouth 3 (three) times a week. Monday, Wednesday, Friday  Disp:  Rfl:    aspirin 81 MG Oral Tab Take 81 mg by mouth daily.  Disp:  Rfl:    ferrous slade unremarkable except as mentioned above.     Vital signs:  BP 98/70 (BP Location: Right arm)   Pulse 81   Temp 98.7 °F (37.1 °C) (Oral)   Resp 16   Ht 4' 8\" (1.422 m)   Wt 120 lb (54.4 kg)   SpO2 100%   BMI 26.90 kg/m²     Intake/Output:    Intake/Output Morgan 7.4*   --    NA  144   --   142  140   --    K  2.8*   < >  4.2  3.4*  4.5   CL  106   --   102  100*   --    CO2  31.0   --   34.0*  31.0   --     < > = values in this interval not displayed.      No results for input(s): ABGPHT, FBAUOB0Z, EEALS5M, ABGHCO

## 2017-11-01 NOTE — PROGRESS NOTES
[facility]  Progress Note    Adrianna Cope Patient Status:  Inpatient    1940 MRN [id number]   [facility] 3NW-A Attending Charlotte Malloy MD   Hosp Day # 6 PCP Julien Taveras MD     CC:  Intractable right foot swelling and pain refill seen in big toe tip and toes warmer today compared to yesterday  Integument: Right foot fifth toe wet gangrene with  mild swelling.   Intractable right foot  discolored bluish black fifth toe same as yesterday, 2 nd toe tip discolored with decreased pressures in high 40&apos;s mmHg. Impressions:  No previous study was available for comparison.   *      Addendum: CTA results were pending at the time and I saw patient and discussed with the patient's son  CTA ABD PEL MIMI LEG RUNOFF DIAPH TO TOES IV CONT side:  Proximal left common iliac artery, distally, 60%  Left external iliac artery 100%, image 95 series 6 where there is a focal absence of blood flow in this vessel, but with reconstitution immediately distal.  Left common femoral artery 100% on image 1 of the joint space.  Old left pubic rami fractures.     =====  CONCLUSION:  Atherosclerotic stenoses bilaterally, including occlusions with reconstitution, including occlusion of the right distal femoral artery extending into proximal and mid popliteal with HYDROcodone-acetaminophen (NORCO) 5-325 MG per tab 2 tablet 2 tablet Oral Q4H PRN   ondansetron HCl (ZOFRAN) injection 4 mg 4 mg Intravenous Q6H PRN   aspirin chewable tab 81 mg 81 mg Oral Daily   cholestyramine light (PREVALITE) powder packet 4 g 4 g Or Clinical progress  At this point Ms. Butch Gonzalez  is expected to be discharge to: TBD      Questions/concerns and Plan of care were discussed with patient     Patel Machuca M.D.   Lisy Rice

## 2017-11-02 PROCEDURE — 99232 SBSQ HOSP IP/OBS MODERATE 35: CPT | Performed by: HOSPITALIST

## 2017-11-02 RX ORDER — AMIODARONE HYDROCHLORIDE 200 MG/1
400 TABLET ORAL 2 TIMES DAILY WITH MEALS
Status: DISCONTINUED | OUTPATIENT
Start: 2017-11-02 | End: 2017-11-03

## 2017-11-02 NOTE — PROGRESS NOTES
[facility]                INFECTIOUS DISEASE PROGRESS NOTE    Omaha Daisy Patient Status:  Inpatient    1940 MRN [id number]   [facility] 3NW-A Attending Sergey Acevedo MD   Hosp Day # 7 PCP MD Cristopher Jones [place] [employer]    Problem list reviewed:  Patient Active Problem List:     Cellulitis of right lower extremity     Toe gangrene (HCC)     Essential hypertension     PVD (peripheral vascular disease) (HCC)     CKD (chronic kidney disease) stage 3, GFR 30-59 ml/min

## 2017-11-02 NOTE — PLAN OF CARE
PT VERY ANXIOUS RE RESULTS OF CT SCAN. ASKING RN FOR RESULTS. WAITING ON DR. LONG GTT DC'D PO STARTED. SON AT BEDSIDE.

## 2017-11-02 NOTE — PLAN OF CARE
Assumed care at [address]. AOx4, VSS. No signs of cardiac or respiratory distress noted or reported. Heparin gtt infusing at 3cc/h  Pedal pulses by doppler  Due medications given, needs attended, will continue to monitor.     Impaired Functional Mobility

## 2017-11-02 NOTE — PROGRESS NOTES
[facility]  Cardiology Progress Note    Filemon Monroe Patient Status:  Inpatient    1940 MRN [id number]   [facility] 7NE-A Attending Aicha Vasques MD   Hosp Day # 7 PCP Giuliano Garrison MD     Subjective:  No complaints of kandis Chlorhexidine Gluconate  30 mL Topical Daily   • aspirin  81 mg Oral Daily   • cholestyramine light  4 g Oral BID   • ferrous sulfate  325 mg Oral Daily with breakfast   • gabapentin  600 mg Oral BID   • Pantoprazole Sodium  20 mg Oral QAM AC   • Pentoxify

## 2017-11-02 NOTE — PROGRESS NOTES
[facility]                INFECTIOUS DISEASE PROGRESS NOTE    Ankush Claros Patient Status:  Inpatient    1940 MRN [id number]   [facility] 3NW-A Attending Nereida Roper MD   Hosp Day # 6 PCP MD Braden Figueroa 1200   Specimen: Blood from Blood,peripheral     Blood Culture Result No Growth 3 Days   MRSA Culture Only Once [[id number]] (Abnormal) Collected: 10/26/17 1630   Order Status: Completed Lab Status: Final result Updated: 10/27/17 2225   Specimen: Other from

## 2017-11-02 NOTE — PROGRESS NOTES
[facility]  Progress Note    Gaurav Rowley Patient Status:  Inpatient    1940 MRN [id number]   [facility] 7NE-A Attending Jackie Lindquist MD   Hosp Day # 7 PCP Susan Rouse MD     Subjective:  Gaurav Rowley is a(n) 65 year acidosis    Assessment and Plan:     · Enlarging nodular lesion of the right middle lobe with a negative PET scan in the past-this raises concern for an enlarging adenocarcinoma. CT chest reviewed with no significant adenopathy and no other lesions noted.

## 2017-11-02 NOTE — PROGRESS NOTES
Right  foot gangrene with right  iliofemoral/sfa/tibial vessel occlusive disease with right middle lobe nodule suspicious for cancer. Discussed with Dr. Dawson Mcburney- plan revascularization- but needs medical clearance.  Possible right  iliofemoral endarterectom

## 2017-11-02 NOTE — PROGRESS NOTES
[facility]  Progress Note    Rni Wellington Patient Status:  Inpatient    1940 MRN [id number]   [facility] 3NW-A Attending Randal Espinosa MD   Hosp Day # 7 PCP Mario Saavedra MD     CC:  Intractable right foot swelling and pain swelling.   Intractable right foot  discolored bluish black fifth toe same as yesterday, 2 nd toe tip discolored with decreased blanching same, foot and toes with less erythema, more pink in color with better capillary refill seen in big toe tip and toes wa COMPARISON:  None.      INDICATIONS:  pvd     TECHNIQUE:  CT images of the abdomen, pelvis, and lower extremities were obtained pre- and post- injection of non-ionic intravenous contrast material. Multi-planar reformatted/3-D images were created to optimi a focal absence of blood flow in this vessel, but with reconstitution immediately distally.   Left femoral artery 80% image 216, but compared with the right side, this vessel on the left never becomes completely occluded with continuous blood flow throughou distal vascularity. On the left, stenoses   and focal occlusions of iliac vasculature, and significant stenoses without occlusion of lower extremity arterial vasculature. Percentages are given above.  Other nonvascular findings, extensive, are given above a Once per day on Mon Wed Fri   Senna-Docusate Sodium (SENOKOT S) 8.6-50 MG tab 2 tablet 2 tablet Oral Daily PRN   CeFAZolin in isotonic solution (ANCEF) 1 GM/50ML IVPB premix 1 g 1 g Intravenous Q12H       ASSESSMENT / PLAN:     1.  Right foot fifth toe gang

## 2017-11-03 ENCOUNTER — APPOINTMENT (OUTPATIENT)
Dept: ULTRASOUND IMAGING | Facility: HOSPITAL | Age: 77
DRG: 300 | End: 2017-11-03
Attending: SURGERY
Payer: MEDICARE

## 2017-11-03 PROCEDURE — 93970 EXTREMITY STUDY: CPT | Performed by: SURGERY

## 2017-11-03 PROCEDURE — 99232 SBSQ HOSP IP/OBS MODERATE 35: CPT | Performed by: HOSPITALIST

## 2017-11-03 PROCEDURE — 93926 LOWER EXTREMITY STUDY: CPT | Performed by: SURGERY

## 2017-11-03 RX ORDER — AMIODARONE HYDROCHLORIDE 200 MG/1
200 TABLET ORAL 2 TIMES DAILY WITH MEALS
Status: DISCONTINUED | OUTPATIENT
Start: 2017-11-03 | End: 2017-11-05

## 2017-11-03 NOTE — CONSULTS
[facility]    PATIENT'S NAME: [facility]   ATTENDING PHYSICIAN: MICHAEL Tovar: Yvon Yost M.D.    PATIENT ACCOUNT#:   [de-identified]    LOCATION:  [address] [place] [address]  MEDICAL RECORD #:   [id number]       DATE OF BIRTH: breath, previous MI, but she does have history of coronary artery bypass surgery she said was done in [place] years ago. She denies any CVA, TIA, visual field defect, or aphasia. She denies any abdominal pain or back pain as well as nausea or vomiting.   Yessy Stevens suggested a right middle lobe lesion, which she has known about, but she states it is increasing in size compatible with possible malignancy. I do not see a blood flow study on the patient.     IMPRESSION:  A patient with a history of coronary artery d M.D.  d: 11/02/2017 18:47:16  t: 11/02/2017 19:29:04  [facility] [id number]  JJW/    cc: MICHAEL Mann M.D. Tollie Asp, M.D. Gwen Hugh, M.D. Madelyne Blamer, M.D.

## 2017-11-03 NOTE — PROGRESS NOTES
Reviewed films with Dr. Donaldson Media- patient aware good chance would not heal wounds/gangren with major limb amputation. She currently not agreeable for surgery. Would let her go home- plan for future revascularization.  Will give her a conservative chance to h

## 2017-11-03 NOTE — PROGRESS NOTES
Seen and examined. Feels okay. Right foot not bothering her.     Afebrile  105/59   68 regular -- no further afib    Lungs clear  Ht RRR prominent FRANK s/o MVP/MR  abd soft  Ext right foot definitely better with antibiotics -- cellulitic changes resolved

## 2017-11-03 NOTE — PROGRESS NOTES
[facility]  Progress Note    Moreno Baumann Patient Status:  Inpatient    1940 MRN [id number]   [facility] 3NW-A Attending Darrell Morales MD   Hosp Day # 8 PCP Parris Dubin, MD     CC:  Intractable right foot swelling and pain to yesterday  Integument: Right foot fifth toe wet gangrene with  mild swelling.   Intractable right foot  discolored bluish black fifth toe same as yesterday, 2 nd toe tip discolored with decreased blanching same, foot and toes with less erythema, more pin non-ionic intravenous contrast material. Multi-planar reformatted/3-D images were created to optimize visualization of vascular anatomy. Dose   reduction techniques were used.  Dose information is transmitted to the  [facility] of Radiology) Dali Godfrey side, this vessel on the left never becomes completely occluded with continuous blood flow throughout. Left popliteal 60%. Trifurcation vasculature on the left side appear patent.      On the very first image #1 only partially seen middle lobe mass 2.6 x arterial vasculature. Percentages are given above. Other nonvascular findings, extensive, are given above as well.       Dictated by: Ivis Early MD on 10/31/2017 at 14:27       Approved by: Ivis Early MD                Meds:     Current Facility-A ASSESSMENT / PLAN:     1. Right foot fifth toe gangrene with right foot cellulitis with severe peripheral vascular disease right lower extremity with occluded posterior tibial artery and proximal popliteal artery  1. IV antibiotics  2.  Seen by Efe Beavers

## 2017-11-03 NOTE — CONSULTS
Palliative Care Consultation:  Received consultation order for palliative care. After explaining role of palliative care and reason for consultation, patient declines service. Palliative Care will sign off. Please re-consult if needed.   Baltazar Garcia

## 2017-11-03 NOTE — PLAN OF CARE
Assumed care at 1900. No acute issues overnight. Prn Norco for pain. Ambien given per pt request.   NSR, PVC's on tele. Bilateral PT, DP pulses per doppler. Right toes w/ gangrene, lucille. Hep gtt per protocol, ptt in am.   Vitals stable.      Impaired

## 2017-11-03 NOTE — PROGRESS NOTES
[facility]  Progress Note    Dee Zaragoza Patient Status:  Inpatient    1940 MRN [id number]   [facility] 7NE-A Attending Patrice Marino MD   Hosp Day # 8 PCP Lorenza Valentine MD     Subjective:  Dee Zaragoza is a(n) 65 year CT Chest:  FINDINGS:    LUNGS:  Stable biapical pleural-parenchymal scarring. Left apical subpleural blebs consistent with emphysematous changes predominantly involve the upper lobes. Stable left apical calcified granuloma.  Interval increase in size of rig with only mildly hypokinetic distal     septum c/w o CABG history. Features are consistent with a pseudonormal     left ventricular filling pattern, with concomitant abnormal relaxation     and increased filling pressure - grade 2 diastolic dysfunction.   2  Doppler:  Transvalvular velocity was within the normal range. There was no stenosis. No significant regurgitation.   Tricuspid valve:   Structurally normal valve.   Leaflet separation was  normal.  Doppler:  Transvalvular velocity was within the normal ra unable to be determined by CT or PET and would require biopsy to diagnosis.  She and her son explain she is overwhelmed with her lower extremity condition and at this time, have no interest in pursuing further evaluation of the nodule despite being aware it

## 2017-11-04 PROCEDURE — 99232 SBSQ HOSP IP/OBS MODERATE 35: CPT | Performed by: HOSPITALIST

## 2017-11-04 RX ORDER — HYDROCODONE BITARTRATE AND ACETAMINOPHEN 5; 325 MG/1; MG/1
1 TABLET ORAL EVERY 8 HOURS PRN
Qty: 15 TABLET | Refills: 0 | Status: ON HOLD | OUTPATIENT
Start: 2017-11-04 | End: 2018-03-01

## 2017-11-04 RX ORDER — CEPHALEXIN 500 MG/1
500 CAPSULE ORAL 2 TIMES DAILY
Qty: 20 CAPSULE | Refills: 0 | Status: SHIPPED | OUTPATIENT
Start: 2017-11-04 | End: 2017-11-14

## 2017-11-04 RX ORDER — AMIODARONE HYDROCHLORIDE 200 MG/1
200 TABLET ORAL DAILY
Qty: 30 TABLET | Refills: 3 | Status: SHIPPED | OUTPATIENT
Start: 2017-11-04

## 2017-11-04 RX ORDER — POTASSIUM CHLORIDE 20 MEQ/1
40 TABLET, EXTENDED RELEASE ORAL ONCE
Status: COMPLETED | OUTPATIENT
Start: 2017-11-04 | End: 2017-11-04

## 2017-11-04 NOTE — PLAN OF CARE
On review of medications, Ms Srinivas Galvez has a plavix allergy, so will need to continue on asa and pletal.

## 2017-11-04 NOTE — CM/SW NOTE
Per unit RN, pt was cleared for d/c today to [facility] return. SW attempted to reach admissions office, nursing supervisor, anyone in charge ph#[phone]. Tried to call Pt's unit nurse there, no answer.  The  said they were all gone f

## 2017-11-04 NOTE — PLAN OF CARE
SBP on lower side, however this is chronic. Remained unsymptomatic. Patient preferrring to be as conservative as possible. Spent time on the phone with family to explain next steps as Dr. Jada Leos and DR. Brittni Taylor have cleared patient for discharge with follow

## 2017-11-04 NOTE — PLAN OF CARE
Appetite fair. Remain incontinent. Bottom reddened, but blanchable. Aloe vesta being applied. Patient able to change positions. Had large BM today. Pain tolerable. Toes still at baseline necrotizing. Heparin gtt therapeutic.  Spoke with patient and then spo

## 2017-11-04 NOTE — PROGRESS NOTES
[facility]  Progress Note    Luis E Valverde Patient Status:  Inpatient    1940 MRN [id number]   [facility] 3NW-A Attending Nikolay Borden MD   Hosp Day # 9 PCP Shayy Sol MD     CC:  Intractable right foot swelling and pain yesterday  Integument: Right foot fifth toe wet gangrene with  mild swelling.   Intractable right foot  discolored bluish black fifth toe same as yesterday, 2 nd toe tip discolored with decreased blanching same, foot and toes with less erythema, more pink i material. Multi-planar reformatted/3-D images were created to optimize visualization of vascular anatomy. Dose   reduction techniques were used.  Dose information is transmitted to the [facility] ([address] [place] [address] of Radiology) Anna. [address] never becomes completely occluded with continuous blood flow throughout. Left popliteal 60%. Trifurcation vasculature on the left side appear patent. On the very first image #1 only partially seen middle lobe mass 2.6 x 1.9 CM.  Bilateral lower lobe a given above. Other nonvascular findings, extensive, are given above as well.       Dictated by: Judy Pedraza MD on 10/31/2017 at 14:27       Approved by: Judy Pedraza MD                Meds:     Current Facility-Administered Medications:  amiodarone H disease right lower extremity with occluded posterior tibial artery and proximal popliteal artery  1. IV antibiotics- to transition to orals on discharge  2.  Seen by interventional cardiology, vascular ; attempted peripheral angiogram by cardiologist and aditya

## 2017-11-04 NOTE — PROGRESS NOTES
[facility] Cardiology Progress Note    Subjective:  No pain foot. Has been wheelchair bound now for ~ 6 years.      Objective:  /63 (BP Location: Right arm)   Pulse 63   Temp 98.2 °F (36.8 °C) (Oral)   Resp 18   Ht 142.2 cm (4' 8\")   Wt 120 lb (54.4 kg)

## 2017-11-04 NOTE — PLAN OF CARE
Assumed care of pt at 0000. Pt A/O X4. RA. SB on tele. Heparin running at 200 units/hr. Mild back pain. RT toes gangrenous, LUIS ANGEL. Pt is sleeping comfortably. Will continue to monitor.      Impaired Functional Mobility    • Achieve highest/safest level of mob

## 2017-11-05 VITALS
BODY MASS INDEX: 26.99 KG/M2 | OXYGEN SATURATION: 94 % | HEART RATE: 67 BPM | HEIGHT: 56 IN | DIASTOLIC BLOOD PRESSURE: 66 MMHG | RESPIRATION RATE: 18 BRPM | TEMPERATURE: 99 F | SYSTOLIC BLOOD PRESSURE: 105 MMHG | WEIGHT: 120 LBS

## 2017-11-05 PROCEDURE — 99239 HOSP IP/OBS DSCHRG MGMT >30: CPT | Performed by: HOSPITALIST

## 2017-11-05 RX ORDER — HEPARIN SODIUM 5000 [USP'U]/ML
30 INJECTION INTRAVENOUS; SUBCUTANEOUS ONCE
Status: COMPLETED | OUTPATIENT
Start: 2017-11-05 | End: 2017-11-05

## 2017-11-05 NOTE — PROGRESS NOTES
[facility]  Progress Note    Rin Wellington Patient Status:  Inpatient    1940 MRN [id number]   [facility] 7NE-A Attending Ignacio Brown MD   Hosp Day # 10 PCP Mario Saavedra MD       Assessment and Plan:  Patient Active Prob Enalapril                 Lisinopril                Plavix [Clopidogrel]        Physical Exam:  Blood pressure 116/68, pulse 71, temperature 98.2 °F (36.8 °C), temperature source Oral, resp.  rate 18, height 4' 8\" (1.422 m), weight 120 lb (54.4 kg), injection 4 mg 4 mg Intravenous Q6H PRN   aspirin chewable tab 81 mg 81 mg Oral Daily   cholestyramine light (PREVALITE) powder packet 4 g 4 g Oral BID   ferrous sulfate EC tab 325 mg 325 mg Oral Daily with breakfast   gabapentin (NEURONTIN) tab 600 mg 600

## 2017-11-05 NOTE — DISCHARGE SUMMARY
[facility]  DISCHARGE SUMMARY     Martin Workman Patient Status:  Inpatient    1940 MRN [id number]   [facility] 7NE-A Attending No att. providers found   Hosp Day # 10 PCP Omar Coon MD     Date of Admission: 10/26/2017 pain or shortness of breath. Denies abdominal pain. Denies any nausea vomiting. Denies any focal weakness or numbness. Denies any headache.   Denies any dysuria frequency       Brief Synopsis: Patient is a 78-year-old female with right foot toe gangrene Caps  Commonly known as:  KEFLEX      Take 1 capsule (500 mg total) by mouth 2 (two) times daily. Stop taking on:  11/14/2017  Quantity:  20 capsule  Refills:  0     metoprolol Tartrate 25 MG Tabs  Commonly known as:  LOPRESSOR      Take 0.5 tablets (12. needed.    Refills:  0        STOP taking these medications    AmLODIPine Besylate 10 MG Tabs  Commonly known as:  NORVASC        atenolol 50 MG Tabs  Commonly known as:  TENORMIN        furosemide 20 MG Tabs  Commonly known as:  LASIX        potassium chlo minutes

## 2017-11-05 NOTE — CM/SW NOTE
JASON notified of patient d/c today. JASON spoke with Anni MOCK at [facility] and notified of pt's return today, [address] updates sent. RN to call report to [phone], pt will be going to 2nd floor.      JASON completed MCA form and [facility]IN referral sent to [facility]

## 2017-11-05 NOTE — PROGRESS NOTES
Patient was seen resting comfortably in bed she is in no acute distress the right foot is feeling better. I have reviewed her notes showing that vascular surgery is recommended however she does not wish to proceed with that. Objective:  Today evaluated

## 2017-11-05 NOTE — PLAN OF CARE
AOx4, RA-2L, SR/SB via tele  Patient briefed - incontinent at times  IV infiltrated R arm - heat pack applied  Heparin gtt @4 currently  2nd and 5th toes blackened and red  Pulses found per doppler  Isolation precautions in place  VSS, patient resting comf

## 2017-11-05 NOTE — PROGRESS NOTES
[facility]  Progress Note    Mone Bryan Patient Status:  Inpatient    1940 MRN [id number]   [facility] 3NW-A Attending Benji Ahmadi MD   Hosp Day # 10 PCP Kalpesh Mitchell MD     CC:  Intractable right foot swelling and rg to yesterday  Integument: Right foot fifth toe wet gangrene with  mild swelling.   Intractable right foot  discolored bluish black fifth toe same as yesterday, 2 nd toe tip discolored with decreased blanching same, foot and toes with less erythema, more pin RUNOFF DIAPH TO TOES IV CONTRAST (CPT=75635)     COMPARISON:  None.      INDICATIONS:  pvd     TECHNIQUE:  CT images of the abdomen, pelvis, and lower extremities were obtained pre- and post- injection of non-ionic intravenous contrast material. Leora Mueller femoral artery 100% on image 108, where there is a focal absence of blood flow in this vessel, but with reconstitution immediately distally.   Left femoral artery 80% image 216, but compared with the right side, this vessel on the left never becomes complet proximal and mid popliteal with reconstitution of distal vascularity. On the left, stenoses   and focal occlusions of iliac vasculature, and significant stenoses without occlusion of lower extremity arterial vasculature. Percentages are given above.  Other tablet 2 tablet Oral Daily PRN   CeFAZolin in isotonic solution (ANCEF) 1 GM/50ML IVPB premix 1 g 1 g Intravenous Q12H       ASSESSMENT / PLAN:     1.  Right foot fifth toe gangrene with right foot cellulitis with severe peripheral vascular disease right lo

## 2017-11-05 NOTE — PLAN OF CARE
NURSING DISCHARGE NOTE    Discharged Nursing home via Ambulance. Accompanied by Support staff  Belongings Taken by patient/family. VSS. IV removed without s/s of complications.  Discharge instructions given to patient and Montserrat Martínez the nurse accepting

## 2017-11-06 NOTE — CM/SW NOTE
11/06/17 1000   Discharge disposition   Discharged to: Skilled Nurs   Name of Facillity/Home Care/Hospice [facility]   Patient is Discharged to a [address] [place] [address] Yes   Discharge transportation [employer]

## 2017-11-13 ENCOUNTER — LAB REQUISITION (OUTPATIENT)
Dept: LAB | Facility: HOSPITAL | Age: 77
End: 2017-11-13
Attending: FAMILY MEDICINE
Payer: MEDICARE

## 2017-11-13 DIAGNOSIS — R69 ILLNESS: ICD-10-CM

## 2017-11-13 PROCEDURE — 85025 COMPLETE CBC W/AUTO DIFF WBC: CPT | Performed by: FAMILY MEDICINE

## 2017-12-19 PROBLEM — I70.261 ATHEROSCLEROSIS OF NATIVE ARTERY OF RIGHT LOWER EXTREMITY WITH GANGRENE (HCC): Status: ACTIVE | Noted: 2017-12-19

## 2018-01-04 RX ORDER — ZOLPIDEM TARTRATE 5 MG/1
5 TABLET ORAL NIGHTLY PRN
COMMUNITY
End: 2018-01-12

## 2018-01-04 RX ORDER — MULTIVITAMIN
1 TABLET ORAL DAILY
Status: ON HOLD | COMMUNITY
End: 2018-03-01

## 2018-01-09 ENCOUNTER — HOSPITAL ENCOUNTER (OUTPATIENT)
Dept: INTERVENTIONAL RADIOLOGY/VASCULAR | Facility: HOSPITAL | Age: 78
Discharge: NURSING, REHAB OR LONG-TERM CARE | End: 2018-01-10
Attending: SURGERY | Admitting: SURGERY
Payer: MEDICARE

## 2018-01-09 DIAGNOSIS — I70.201 GANGRENE OF RIGHT LOWER EXTREMITY DUE TO ATHEROSCLEROSIS (HCC): ICD-10-CM

## 2018-01-09 DIAGNOSIS — I96 GANGRENE OF RIGHT LOWER EXTREMITY DUE TO ATHEROSCLEROSIS (HCC): ICD-10-CM

## 2018-01-09 PROBLEM — I10 BENIGN ESSENTIAL HTN: Chronic | Status: ACTIVE | Noted: 2017-10-26

## 2018-01-09 LAB
BUN BLD-MCNC: 25 MG/DL (ref 8–20)
CALCIUM BLD-MCNC: 8.9 MG/DL (ref 8.3–10.3)
CHLORIDE: 110 MMOL/L (ref 101–111)
CO2: 26 MMOL/L (ref 22–32)
CREAT BLD-MCNC: 1.52 MG/DL (ref 0.55–1.02)
ERYTHROCYTE [DISTWIDTH] IN BLOOD BY AUTOMATED COUNT: 15.4 % (ref 11.5–16)
GLUCOSE BLD-MCNC: 94 MG/DL (ref 70–99)
HCT VFR BLD AUTO: 31.2 % (ref 34–50)
HGB BLD-MCNC: 9.1 G/DL (ref 12–16)
MCH RBC QN AUTO: 26.2 PG (ref 27–33.2)
MCHC RBC AUTO-ENTMCNC: 29.2 G/DL (ref 31–37)
MCV RBC AUTO: 89.9 FL (ref 81–100)
PLATELET # BLD AUTO: 158 10(3)UL (ref 150–450)
POTASSIUM SERPL-SCNC: 4.3 MMOL/L (ref 3.6–5.1)
RBC # BLD AUTO: 3.47 X10(6)UL (ref 3.8–5.1)
RED CELL DISTRIBUTION WIDTH-SD: 51 FL (ref 35.1–46.3)
SODIUM SERPL-SCNC: 143 MMOL/L (ref 136–144)
WBC # BLD AUTO: 6.6 X10(3) UL (ref 4–13)

## 2018-01-09 PROCEDURE — 99214 OFFICE O/P EST MOD 30 MIN: CPT | Performed by: INTERNAL MEDICINE

## 2018-01-09 PROCEDURE — 047H3DZ DILATION OF RIGHT EXTERNAL ILIAC ARTERY WITH INTRALUMINAL DEVICE, PERCUTANEOUS APPROACH: ICD-10-PCS | Performed by: SURGERY

## 2018-01-09 PROCEDURE — 047C3DZ DILATION OF RIGHT COMMON ILIAC ARTERY WITH INTRALUMINAL DEVICE, PERCUTANEOUS APPROACH: ICD-10-PCS | Performed by: SURGERY

## 2018-01-09 PROCEDURE — B410YZZ FLUOROSCOPY OF ABDOMINAL AORTA USING OTHER CONTRAST: ICD-10-PCS | Performed by: SURGERY

## 2018-01-09 PROCEDURE — B41FYZZ FLUOROSCOPY OF RIGHT LOWER EXTREMITY ARTERIES USING OTHER CONTRAST: ICD-10-PCS | Performed by: SURGERY

## 2018-01-09 RX ORDER — CHOLESTYRAMINE LIGHT 4 G/5.7G
4 POWDER, FOR SUSPENSION ORAL 2 TIMES DAILY
Status: DISCONTINUED | OUTPATIENT
Start: 2018-01-09 | End: 2018-01-10

## 2018-01-09 RX ORDER — HYDROMORPHONE HYDROCHLORIDE 1 MG/ML
0.2 INJECTION, SOLUTION INTRAMUSCULAR; INTRAVENOUS; SUBCUTANEOUS EVERY 2 HOUR PRN
Status: DISCONTINUED | OUTPATIENT
Start: 2018-01-09 | End: 2018-01-10

## 2018-01-09 RX ORDER — ONDANSETRON 2 MG/ML
4 INJECTION INTRAMUSCULAR; INTRAVENOUS EVERY 6 HOURS PRN
Status: DISCONTINUED | OUTPATIENT
Start: 2018-01-09 | End: 2018-01-09

## 2018-01-09 RX ORDER — HYDROMORPHONE HYDROCHLORIDE 1 MG/ML
0.8 INJECTION, SOLUTION INTRAMUSCULAR; INTRAVENOUS; SUBCUTANEOUS EVERY 2 HOUR PRN
Status: DISCONTINUED | OUTPATIENT
Start: 2018-01-09 | End: 2018-01-10

## 2018-01-09 RX ORDER — ZOLPIDEM TARTRATE 5 MG/1
5 TABLET ORAL NIGHTLY PRN
Status: DISCONTINUED | OUTPATIENT
Start: 2018-01-09 | End: 2018-01-10

## 2018-01-09 RX ORDER — SODIUM CHLORIDE 9 MG/ML
INJECTION, SOLUTION INTRAVENOUS CONTINUOUS
Status: DISCONTINUED | OUTPATIENT
Start: 2018-01-09 | End: 2018-01-10

## 2018-01-09 RX ORDER — ALPRAZOLAM 0.25 MG/1
0.25 TABLET ORAL 2 TIMES DAILY PRN
Status: DISCONTINUED | OUTPATIENT
Start: 2018-01-09 | End: 2018-01-10

## 2018-01-09 RX ORDER — HEPARIN SODIUM 5000 [USP'U]/ML
INJECTION, SOLUTION INTRAVENOUS; SUBCUTANEOUS
Status: COMPLETED
Start: 2018-01-09 | End: 2018-01-09

## 2018-01-09 RX ORDER — AMIODARONE HYDROCHLORIDE 200 MG/1
200 TABLET ORAL DAILY
Status: DISCONTINUED | OUTPATIENT
Start: 2018-01-09 | End: 2018-01-10

## 2018-01-09 RX ORDER — LIDOCAINE HYDROCHLORIDE 10 MG/ML
INJECTION, SOLUTION EPIDURAL; INFILTRATION; INTRACAUDAL; PERINEURAL
Status: COMPLETED
Start: 2018-01-09 | End: 2018-01-09

## 2018-01-09 RX ORDER — HYDROMORPHONE HYDROCHLORIDE 1 MG/ML
INJECTION, SOLUTION INTRAMUSCULAR; INTRAVENOUS; SUBCUTANEOUS
Status: COMPLETED
Start: 2018-01-09 | End: 2018-01-09

## 2018-01-09 RX ORDER — GABAPENTIN 600 MG/1
600 TABLET ORAL 2 TIMES DAILY
Status: DISCONTINUED | OUTPATIENT
Start: 2018-01-09 | End: 2018-01-10

## 2018-01-09 RX ORDER — CLOPIDOGREL BISULFATE 75 MG/1
75 TABLET ORAL DAILY
Status: DISCONTINUED | OUTPATIENT
Start: 2018-01-10 | End: 2018-01-10

## 2018-01-09 RX ORDER — CLOPIDOGREL BISULFATE 75 MG/1
300 TABLET ORAL ONCE
Status: COMPLETED | OUTPATIENT
Start: 2018-01-09 | End: 2018-01-09

## 2018-01-09 RX ORDER — MELATONIN
325
Status: DISCONTINUED | OUTPATIENT
Start: 2018-01-10 | End: 2018-01-10

## 2018-01-09 RX ORDER — SODIUM CHLORIDE 9 MG/ML
INJECTION, SOLUTION INTRAVENOUS CONTINUOUS
Status: ACTIVE | OUTPATIENT
Start: 2018-01-09 | End: 2018-01-09

## 2018-01-09 RX ORDER — PROTAMINE SULFATE 10 MG/ML
INJECTION, SOLUTION INTRAVENOUS
Status: COMPLETED
Start: 2018-01-09 | End: 2018-01-09

## 2018-01-09 RX ORDER — FLUTICASONE PROPIONATE 50 MCG
1 SPRAY, SUSPENSION (ML) NASAL DAILY
Status: DISCONTINUED | OUTPATIENT
Start: 2018-01-09 | End: 2018-01-10

## 2018-01-09 RX ORDER — CLOPIDOGREL BISULFATE 75 MG/1
TABLET ORAL
Status: COMPLETED
Start: 2018-01-09 | End: 2018-01-09

## 2018-01-09 RX ORDER — ACETAMINOPHEN 325 MG/1
650 TABLET ORAL EVERY 6 HOURS PRN
Status: DISCONTINUED | OUTPATIENT
Start: 2018-01-09 | End: 2018-01-10

## 2018-01-09 RX ORDER — HYDROMORPHONE HYDROCHLORIDE 1 MG/ML
0.4 INJECTION, SOLUTION INTRAMUSCULAR; INTRAVENOUS; SUBCUTANEOUS EVERY 2 HOUR PRN
Status: DISCONTINUED | OUTPATIENT
Start: 2018-01-09 | End: 2018-01-10

## 2018-01-09 RX ORDER — PENTOXIFYLLINE 400 MG/1
400 TABLET, EXTENDED RELEASE ORAL
Status: DISCONTINUED | OUTPATIENT
Start: 2018-01-09 | End: 2018-01-10

## 2018-01-09 RX ORDER — PANTOPRAZOLE SODIUM 20 MG/1
20 TABLET, DELAYED RELEASE ORAL
Status: DISCONTINUED | OUTPATIENT
Start: 2018-01-10 | End: 2018-01-10

## 2018-01-09 RX ORDER — ATORVASTATIN CALCIUM 10 MG/1
10 TABLET, FILM COATED ORAL NIGHTLY
Status: DISCONTINUED | OUTPATIENT
Start: 2018-01-09 | End: 2018-01-10

## 2018-01-09 RX ORDER — MIDAZOLAM HYDROCHLORIDE 1 MG/ML
INJECTION INTRAMUSCULAR; INTRAVENOUS
Status: COMPLETED
Start: 2018-01-09 | End: 2018-01-09

## 2018-01-09 RX ADMIN — HYDROMORPHONE HYDROCHLORIDE 0.2 MG: 1 INJECTION, SOLUTION INTRAMUSCULAR; INTRAVENOUS; SUBCUTANEOUS at 17:45:00

## 2018-01-09 RX ADMIN — SODIUM CHLORIDE: 9 INJECTION, SOLUTION INTRAVENOUS at 14:45:00

## 2018-01-09 RX ADMIN — SODIUM CHLORIDE: 9 INJECTION, SOLUTION INTRAVENOUS at 19:00:00

## 2018-01-09 RX ADMIN — SODIUM CHLORIDE: 9 INJECTION, SOLUTION INTRAVENOUS at 10:15:00

## 2018-01-09 RX ADMIN — ATORVASTATIN CALCIUM 10 MG: 10 TABLET, FILM COATED ORAL at 20:50:00

## 2018-01-09 RX ADMIN — CLOPIDOGREL BISULFATE 300 MG: 75 TABLET ORAL at 15:45:00

## 2018-01-09 RX ADMIN — HYDROMORPHONE HYDROCHLORIDE 0.2 MG: 1 INJECTION, SOLUTION INTRAMUSCULAR; INTRAVENOUS; SUBCUTANEOUS at 15:20:00

## 2018-01-09 RX ADMIN — GABAPENTIN 600 MG: 600 TABLET ORAL at 21:15:00

## 2018-01-09 RX ADMIN — HYDROMORPHONE HYDROCHLORIDE 0.2 MG: 1 INJECTION, SOLUTION INTRAMUSCULAR; INTRAVENOUS; SUBCUTANEOUS at 15:02:00

## 2018-01-09 RX ADMIN — CHOLESTYRAMINE LIGHT 4 G: 4 POWDER, FOR SUSPENSION ORAL at 20:50:00

## 2018-01-09 NOTE — BRIEF OP NOTE
[facility]  Brief Endovascular Procedure Note     Rosie Alcocer Location: CATH LAB   CSN [id number] MRN [id number]   Admission Date 1/9/2018 Operation Date 1/9/2018   Attending Physician Jerrell Espitia MD Operating Physician Lakisha Corral MD

## 2018-01-09 NOTE — PROGRESS NOTES
Pt's pain is now a 3-4/10 after 0.4 mg of Dilaudid. She is resting comfortably. Right groin is still sensitive to palpation.

## 2018-01-09 NOTE — H&P
VASCULAR SURGERY CONSULT NOTE        Name: Pedro Diop   :   1940  [id number]      REFERRING PHYSICIAN:  Adela Sales  PRIMARY CARE PHYSICIAN:  Malia Rodrigues MD     HISTORY OF PRESENT ILLNESS:   Patient is a 65year old female who has daily., Disp: 30 tablet, Rfl: 3  •  metoprolol Tartrate 25 MG Oral Tab, Take 0.5 tablets (12.5 mg total) by mouth 2x Daily(Beta Blocker). , Disp: 30 tablet, Rfl: 0  •  aspirin 81 MG Oral Tab, Take 81 mg by mouth daily. , Disp: , Rfl:   •  ferrous sulfate 325 nourished, in no apparent distress  PSYCH: normal mood and affect  HEENT: ears and throat are clear  NECK: supple, no lymphadenopathy, thyroid wnl  CAROTID: No bruits  RESPIRATORY: no rales, rhonchi, or wheezes B  CARDIO: RRR without murmur, no murmur, no iliac intervention.  This will be followed by an  Iliofemoral endarterectomy with possible right femoral to below-knee popliteal artery bypass       The patient indicated an understanding of these issues and agreed to the plan and all questions were answere

## 2018-01-09 NOTE — PROGRESS NOTES
Pt returned from procedure. Right groin soft, but tender to touch. Some ecchymosis noted above white dressing. Pt grimacing to touch. Auscultated area and bruit noted. Pt also complained of 10/10 pain to her back area.  She apparently has chronic back pain

## 2018-01-10 VITALS
HEART RATE: 58 BPM | DIASTOLIC BLOOD PRESSURE: 75 MMHG | RESPIRATION RATE: 18 BRPM | TEMPERATURE: 99 F | OXYGEN SATURATION: 98 % | SYSTOLIC BLOOD PRESSURE: 115 MMHG

## 2018-01-10 LAB
BUN BLD-MCNC: 20 MG/DL (ref 8–20)
CALCIUM BLD-MCNC: 8.1 MG/DL (ref 8.3–10.3)
CHLORIDE: 113 MMOL/L (ref 101–111)
CO2: 25 MMOL/L (ref 22–32)
CREAT BLD-MCNC: 1.34 MG/DL (ref 0.55–1.02)
GLUCOSE BLD-MCNC: 87 MG/DL (ref 70–99)
POTASSIUM SERPL-SCNC: 4.3 MMOL/L (ref 3.6–5.1)
SODIUM SERPL-SCNC: 143 MMOL/L (ref 136–144)

## 2018-01-10 PROCEDURE — 99213 OFFICE O/P EST LOW 20 MIN: CPT | Performed by: HOSPITALIST

## 2018-01-10 RX ORDER — CLOPIDOGREL BISULFATE 75 MG/1
75 TABLET ORAL DAILY
Qty: 30 TABLET | Refills: 2 | Status: SHIPPED | OUTPATIENT
Start: 2018-01-10 | End: 2018-01-12

## 2018-01-10 RX ORDER — PANTOPRAZOLE SODIUM 20 MG/1
20 TABLET, DELAYED RELEASE ORAL
Qty: 30 TABLET | Refills: 2 | Status: SHIPPED | OUTPATIENT
Start: 2018-01-11

## 2018-01-10 RX ADMIN — PANTOPRAZOLE SODIUM 20 MG: 20 TABLET, DELAYED RELEASE ORAL at 05:38:00

## 2018-01-10 RX ADMIN — MELATONIN 325 MG: at 08:51:00

## 2018-01-10 RX ADMIN — GABAPENTIN 600 MG: 600 TABLET ORAL at 08:50:00

## 2018-01-10 RX ADMIN — PENTOXIFYLLINE 400 MG: 400 TABLET, EXTENDED RELEASE ORAL at 13:24:00

## 2018-01-10 RX ADMIN — CHOLESTYRAMINE LIGHT 4 G: 4 POWDER, FOR SUSPENSION ORAL at 08:51:00

## 2018-01-10 RX ADMIN — AMIODARONE HYDROCHLORIDE 200 MG: 200 TABLET ORAL at 08:51:00

## 2018-01-10 RX ADMIN — PENTOXIFYLLINE 400 MG: 400 TABLET, EXTENDED RELEASE ORAL at 09:25:00

## 2018-01-10 RX ADMIN — CLOPIDOGREL BISULFATE 75 MG: 75 TABLET ORAL at 08:50:00

## 2018-01-10 NOTE — CM/SW NOTE
Informed by CM that pt is discharged today -back to [address] [place] [address]. She can transport via [address]. SW spoke with RN- discussed 2PM d/c time. Update sent via [facility] to [facility]. Spoke with liaison, Scarlett Sosa, re: 2pm d/c time.

## 2018-01-10 NOTE — PLAN OF CARE
Assumed care at [address]. Alert and oriented x4. Telemetry monitor reading SB. Rt. Groin assessed, no hematoma, bruising, or bleeding. Assessment remains unchanged from beginning of shift. Call light in reach at the bedside. Will continue to monitor.     CARDIO

## 2018-01-10 NOTE — PROGRESS NOTES
NURSING ADMISSION NOTE      Patient admitted via Cart from [facility] @ 1810. Oriented to room. Safety precautions initiated. Bed in low position. Call light in reach.

## 2018-01-10 NOTE — PROGRESS NOTES
Resting comfortably  Dressing removed no hematoma or bruising  Foot warm  No evidence of rash from plavix  Patient can be discharged and femoral artery endarterectomy and bypass will be planned for next week  Continue plavix 75 mg daily

## 2018-01-10 NOTE — PROGRESS NOTES
S:  patient has no complaints; no pain,sob,nausea  O: right 5th toe is black; otherwise patient in NAD and has normal breathing pattern   01/10/18  0758   BP: 115/75   Pulse: 58   Resp: 18   Temp: 98.7 °F (37.1 °C)     A/p:  S/p stenting and angioplasty rig

## 2018-01-10 NOTE — PLAN OF CARE
Assumed care of patient at 0730  AOx4, RA, NSR/SB on tele  Denies pain  VSS  Refusing breakfast currently- saying not hungry  Right groin LUIS ANGLE intact  Up with assist- pivot to chair and to bathroom  D/c planning, today  Contact isolation precautions maintai

## 2018-01-10 NOTE — PROGRESS NOTES
NURSING DISCHARGE NOTE    Discharged [facility] via [facility]  Accompanied by self and ambulance drivers  Belongings packed up by patient and taken with her    Report give to receiving RN at [facility].  All d/c paperwork given to [facility]

## 2018-01-11 NOTE — DISCHARGE SUMMARY
[facility]ITAL[facility]  DISCHARGE SUMMARY     Joce Dunham Patient Status:  Outpatient in a Bed    1940 MRN [id number]   [facility] 7NE-A Attending No att. providers found   Hosp Day # 0 PCP Ridge Liu MD     Date of Admission:  descriptions):  • none    Lab/Test results pending at Discharge:   · none    Consultants:  • Vascular surgery    Discharge Medication List:     Discharge Medications      START taking these medications      Instructions Prescription details   Clopidogrel B LOPRESSOR      Take 0.5 tablets (12.5 mg total) by mouth 2x Daily(Beta Blocker). Quantity:  30 tablet  Refills:  0     ONE-DAILY MULTI VITAMINS Tabs      Take 1 tablet by mouth daily.    Refills:  0     Oyster Shell Calcium/Vitamin D 500-200 MG-UNIT Tabs -----------------------------------------------------------------------------------------------  PATIENT DISCHARGE INSTRUCTIONS: See electronic chart    Joycelyn Pascal MD 1/11/2018    Time spent:  > 30 minutes

## 2018-01-11 NOTE — CM/SW NOTE
01/11/18 0800   Discharge disposition   Discharged to: Skilled Nurs   Name of Facillity/Home Care/Hospice [facility]   Discharge transportation Other (comment)  ([facility] (thru [place]))

## 2018-01-24 ENCOUNTER — ANESTHESIA EVENT (OUTPATIENT)
Dept: CARDIAC SURGERY | Facility: HOSPITAL | Age: 78
DRG: 253 | End: 2018-01-24
Payer: MEDICARE

## 2018-01-24 ENCOUNTER — ANESTHESIA (OUTPATIENT)
Dept: CARDIAC SURGERY | Facility: HOSPITAL | Age: 78
DRG: 253 | End: 2018-01-24
Payer: MEDICARE

## 2018-01-24 ENCOUNTER — SURGERY (OUTPATIENT)
Age: 78
End: 2018-01-24

## 2018-01-24 ENCOUNTER — HOSPITAL ENCOUNTER (INPATIENT)
Facility: HOSPITAL | Age: 78
LOS: 4 days | Discharge: NURSING, REHAB OR LONG-TERM CARE | DRG: 253 | End: 2018-01-28
Attending: SURGERY | Admitting: SURGERY
Payer: MEDICARE

## 2018-01-24 DIAGNOSIS — I70.201 GANGRENE OF RIGHT LOWER EXTREMITY DUE TO ATHEROSCLEROSIS (HCC): Primary | ICD-10-CM

## 2018-01-24 DIAGNOSIS — I96 GANGRENE OF RIGHT LOWER EXTREMITY DUE TO ATHEROSCLEROSIS (HCC): Primary | ICD-10-CM

## 2018-01-24 DIAGNOSIS — I70.261 ATHEROSCLEROSIS OF NATIVE ARTERY OF RIGHT LOWER EXTREMITY WITH GANGRENE (HCC): ICD-10-CM

## 2018-01-24 LAB
ALBUMIN SERPL-MCNC: 3 G/DL (ref 3.5–4.8)
ALP LIVER SERPL-CCNC: 90 U/L (ref 55–142)
ALT SERPL-CCNC: 10 U/L (ref 14–54)
ANTIBODY SCREEN: NEGATIVE
AST SERPL-CCNC: 10 U/L (ref 15–41)
BASOPHILS # BLD AUTO: 0.05 X10(3) UL (ref 0–0.1)
BASOPHILS NFR BLD AUTO: 0.8 %
BILIRUB SERPL-MCNC: 0.2 MG/DL (ref 0.1–2)
BUN BLD-MCNC: 20 MG/DL (ref 8–20)
BUN BLD-MCNC: 22 MG/DL (ref 8–20)
CALCIUM BLD-MCNC: 7.7 MG/DL (ref 8.3–10.3)
CALCIUM BLD-MCNC: 8.6 MG/DL (ref 8.3–10.3)
CHLORIDE: 109 MMOL/L (ref 101–111)
CHLORIDE: 112 MMOL/L (ref 101–111)
CO2: 23 MMOL/L (ref 22–32)
CO2: 25 MMOL/L (ref 22–32)
CREAT BLD-MCNC: 1.34 MG/DL (ref 0.55–1.02)
CREAT BLD-MCNC: 1.47 MG/DL (ref 0.55–1.02)
EOSINOPHIL # BLD AUTO: 0.17 X10(3) UL (ref 0–0.3)
EOSINOPHIL NFR BLD AUTO: 2.7 %
ERYTHROCYTE [DISTWIDTH] IN BLOOD BY AUTOMATED COUNT: 15.1 % (ref 11.5–16)
ERYTHROCYTE [DISTWIDTH] IN BLOOD BY AUTOMATED COUNT: 15.2 % (ref 11.5–16)
GLUCOSE BLD-MCNC: 109 MG/DL (ref 70–99)
GLUCOSE BLD-MCNC: 120 MG/DL (ref 70–99)
HCT VFR BLD AUTO: 28.1 % (ref 34–50)
HCT VFR BLD AUTO: 29.5 % (ref 34–50)
HGB BLD-MCNC: 8.3 G/DL (ref 12–16)
HGB BLD-MCNC: 9 G/DL (ref 12–16)
IMMATURE GRANULOCYTE COUNT: 0.02 X10(3) UL (ref 0–1)
IMMATURE GRANULOCYTE RATIO %: 0.3 %
LYMPHOCYTES # BLD AUTO: 0.93 X10(3) UL (ref 0.9–4)
LYMPHOCYTES NFR BLD AUTO: 14.7 %
M PROTEIN MFR SERPL ELPH: 8.1 G/DL (ref 6.1–8.3)
MCH RBC QN AUTO: 26 PG (ref 27–33.2)
MCH RBC QN AUTO: 26.9 PG (ref 27–33.2)
MCHC RBC AUTO-ENTMCNC: 29.5 G/DL (ref 31–37)
MCHC RBC AUTO-ENTMCNC: 30.5 G/DL (ref 31–37)
MCV RBC AUTO: 88.1 FL (ref 81–100)
MCV RBC AUTO: 88.1 FL (ref 81–100)
MONOCYTES # BLD AUTO: 0.76 X10(3) UL (ref 0.1–0.6)
MONOCYTES NFR BLD AUTO: 12 %
NEUTROPHIL ABS PRELIM: 4.41 X10 (3) UL (ref 1.3–6.7)
NEUTROPHILS # BLD AUTO: 4.41 X10(3) UL (ref 1.3–6.7)
NEUTROPHILS NFR BLD AUTO: 69.5 %
PLATELET # BLD AUTO: 250 10(3)UL (ref 150–450)
PLATELET # BLD AUTO: 284 10(3)UL (ref 150–450)
POTASSIUM SERPL-SCNC: 4 MMOL/L (ref 3.6–5.1)
POTASSIUM SERPL-SCNC: 4.5 MMOL/L (ref 3.6–5.1)
RBC # BLD AUTO: 3.19 X10(6)UL (ref 3.8–5.1)
RBC # BLD AUTO: 3.35 X10(6)UL (ref 3.8–5.1)
RED CELL DISTRIBUTION WIDTH-SD: 48.8 FL (ref 35.1–46.3)
RED CELL DISTRIBUTION WIDTH-SD: 49.2 FL (ref 35.1–46.3)
RH BLOOD TYPE: POSITIVE
SODIUM SERPL-SCNC: 141 MMOL/L (ref 136–144)
SODIUM SERPL-SCNC: 142 MMOL/L (ref 136–144)
WBC # BLD AUTO: 14 X10(3) UL (ref 4–13)
WBC # BLD AUTO: 6.3 X10(3) UL (ref 4–13)

## 2018-01-24 PROCEDURE — 04CK0ZZ EXTIRPATION OF MATTER FROM RIGHT FEMORAL ARTERY, OPEN APPROACH: ICD-10-PCS | Performed by: SURGERY

## 2018-01-24 PROCEDURE — 99221 1ST HOSP IP/OBS SF/LOW 40: CPT | Performed by: HOSPITALIST

## 2018-01-24 PROCEDURE — 04UK0KZ SUPPLEMENT RIGHT FEMORAL ARTERY WITH NONAUTOLOGOUS TISSUE SUBSTITUTE, OPEN APPROACH: ICD-10-PCS | Performed by: SURGERY

## 2018-01-24 PROCEDURE — 36415 COLL VENOUS BLD VENIPUNCTURE: CPT | Performed by: SURGERY

## 2018-01-24 DEVICE — XENOSURE BIOLOGIC PATCH, 0.8CM X 8CM, EIFU
Type: IMPLANTABLE DEVICE | Site: GROIN | Status: FUNCTIONAL
Brand: XENOSURE BIOLOGIC PATCH

## 2018-01-24 RX ORDER — METOCLOPRAMIDE HYDROCHLORIDE 5 MG/ML
5 INJECTION INTRAMUSCULAR; INTRAVENOUS AS NEEDED
Status: ACTIVE | OUTPATIENT
Start: 2018-01-24 | End: 2018-01-24

## 2018-01-24 RX ORDER — MEPERIDINE HYDROCHLORIDE 25 MG/ML
12.5 INJECTION INTRAMUSCULAR; INTRAVENOUS; SUBCUTANEOUS AS NEEDED
Status: ACTIVE | OUTPATIENT
Start: 2018-01-24 | End: 2018-01-24

## 2018-01-24 RX ORDER — CEFAZOLIN SODIUM 1 G/3ML
INJECTION, POWDER, FOR SOLUTION INTRAMUSCULAR; INTRAVENOUS
Status: DISCONTINUED | OUTPATIENT
Start: 2018-01-24 | End: 2018-01-24 | Stop reason: HOSPADM

## 2018-01-24 RX ORDER — ALPRAZOLAM 0.25 MG/1
0.25 TABLET ORAL 2 TIMES DAILY PRN
Status: DISCONTINUED | OUTPATIENT
Start: 2018-01-24 | End: 2018-01-28

## 2018-01-24 RX ORDER — HYDROMORPHONE HYDROCHLORIDE 1 MG/ML
0.8 INJECTION, SOLUTION INTRAMUSCULAR; INTRAVENOUS; SUBCUTANEOUS EVERY 2 HOUR PRN
Status: DISCONTINUED | OUTPATIENT
Start: 2018-01-24 | End: 2018-01-28

## 2018-01-24 RX ORDER — DEXTROSE AND SODIUM CHLORIDE 5; .45 G/100ML; G/100ML
INJECTION, SOLUTION INTRAVENOUS CONTINUOUS
Status: DISCONTINUED | OUTPATIENT
Start: 2018-01-24 | End: 2018-01-25

## 2018-01-24 RX ORDER — DIPHENHYDRAMINE HYDROCHLORIDE 50 MG/ML
12.5 INJECTION INTRAMUSCULAR; INTRAVENOUS AS NEEDED
Status: ACTIVE | OUTPATIENT
Start: 2018-01-24 | End: 2018-01-24

## 2018-01-24 RX ORDER — ACETAMINOPHEN 325 MG/1
650 TABLET ORAL EVERY 6 HOURS PRN
Status: DISCONTINUED | OUTPATIENT
Start: 2018-01-24 | End: 2018-01-28

## 2018-01-24 RX ORDER — ONDANSETRON 2 MG/ML
4 INJECTION INTRAMUSCULAR; INTRAVENOUS AS NEEDED
Status: DISCONTINUED | OUTPATIENT
Start: 2018-01-24 | End: 2018-01-24

## 2018-01-24 RX ORDER — ENOXAPARIN SODIUM 100 MG/ML
30 INJECTION SUBCUTANEOUS DAILY
Status: DISCONTINUED | OUTPATIENT
Start: 2018-01-25 | End: 2018-01-26

## 2018-01-24 RX ORDER — CEFAZOLIN SODIUM/WATER 2 G/20 ML
2 SYRINGE (ML) INTRAVENOUS EVERY 12 HOURS
Status: COMPLETED | OUTPATIENT
Start: 2018-01-25 | End: 2018-01-25

## 2018-01-24 RX ORDER — HYDROMORPHONE HYDROCHLORIDE 1 MG/ML
0.5 INJECTION, SOLUTION INTRAMUSCULAR; INTRAVENOUS; SUBCUTANEOUS EVERY 2 HOUR PRN
Status: DISCONTINUED | OUTPATIENT
Start: 2018-01-24 | End: 2018-01-28

## 2018-01-24 RX ORDER — HYDROMORPHONE HYDROCHLORIDE 1 MG/ML
1 INJECTION, SOLUTION INTRAMUSCULAR; INTRAVENOUS; SUBCUTANEOUS EVERY 2 HOUR PRN
Status: DISCONTINUED | OUTPATIENT
Start: 2018-01-24 | End: 2018-01-28

## 2018-01-24 RX ORDER — GABAPENTIN 600 MG/1
600 TABLET ORAL 2 TIMES DAILY
Status: DISCONTINUED | OUTPATIENT
Start: 2018-01-24 | End: 2018-01-28

## 2018-01-24 RX ORDER — HYDROCODONE BITARTRATE AND ACETAMINOPHEN 10; 325 MG/1; MG/1
2 TABLET ORAL EVERY 4 HOURS PRN
Status: DISCONTINUED | OUTPATIENT
Start: 2018-01-24 | End: 2018-01-25

## 2018-01-24 RX ORDER — HYDROMORPHONE HYDROCHLORIDE 1 MG/ML
0.4 INJECTION, SOLUTION INTRAMUSCULAR; INTRAVENOUS; SUBCUTANEOUS EVERY 2 HOUR PRN
Status: DISCONTINUED | OUTPATIENT
Start: 2018-01-24 | End: 2018-01-28

## 2018-01-24 RX ORDER — HYDROMORPHONE HYDROCHLORIDE 1 MG/ML
INJECTION, SOLUTION INTRAMUSCULAR; INTRAVENOUS; SUBCUTANEOUS
Status: COMPLETED
Start: 2018-01-24 | End: 2018-01-24

## 2018-01-24 RX ORDER — NALBUPHINE HCL 10 MG/ML
2.5 AMPUL (ML) INJECTION AS NEEDED
Status: ACTIVE | OUTPATIENT
Start: 2018-01-24 | End: 2018-01-24

## 2018-01-24 RX ORDER — ATORVASTATIN CALCIUM 10 MG/1
10 TABLET, FILM COATED ORAL NIGHTLY
Status: DISCONTINUED | OUTPATIENT
Start: 2018-01-24 | End: 2018-01-28

## 2018-01-24 RX ORDER — LABETALOL HYDROCHLORIDE 5 MG/ML
5 INJECTION, SOLUTION INTRAVENOUS EVERY 5 MIN PRN
Status: ACTIVE | OUTPATIENT
Start: 2018-01-24 | End: 2018-01-24

## 2018-01-24 RX ORDER — HYDROCODONE BITARTRATE AND ACETAMINOPHEN 5; 325 MG/1; MG/1
2 TABLET ORAL EVERY 6 HOURS PRN
Status: DISCONTINUED | OUTPATIENT
Start: 2018-01-24 | End: 2018-01-28

## 2018-01-24 RX ORDER — BUPIVACAINE HYDROCHLORIDE 5 MG/ML
INJECTION, SOLUTION EPIDURAL; INTRACAUDAL AS NEEDED
Status: DISCONTINUED | OUTPATIENT
Start: 2018-01-24 | End: 2018-01-24 | Stop reason: HOSPADM

## 2018-01-24 RX ORDER — SODIUM CHLORIDE, SODIUM LACTATE, POTASSIUM CHLORIDE, CALCIUM CHLORIDE 600; 310; 30; 20 MG/100ML; MG/100ML; MG/100ML; MG/100ML
INJECTION, SOLUTION INTRAVENOUS CONTINUOUS
Status: DISCONTINUED | OUTPATIENT
Start: 2018-01-24 | End: 2018-01-24

## 2018-01-24 RX ORDER — METOPROLOL TARTRATE 5 MG/5ML
2.5 INJECTION INTRAVENOUS ONCE
Status: DISCONTINUED | OUTPATIENT
Start: 2018-01-24 | End: 2018-01-26

## 2018-01-24 RX ORDER — HYDROCODONE BITARTRATE AND ACETAMINOPHEN 5; 325 MG/1; MG/1
1 TABLET ORAL EVERY 6 HOURS PRN
Status: DISCONTINUED | OUTPATIENT
Start: 2018-01-24 | End: 2018-01-28

## 2018-01-24 RX ORDER — MIDAZOLAM HYDROCHLORIDE 1 MG/ML
0.5 INJECTION INTRAMUSCULAR; INTRAVENOUS EVERY 5 MIN PRN
Status: ACTIVE | OUTPATIENT
Start: 2018-01-24 | End: 2018-01-24

## 2018-01-24 RX ORDER — ASPIRIN 300 MG
300 SUPPOSITORY, RECTAL RECTAL DAILY
Status: DISCONTINUED | OUTPATIENT
Start: 2018-01-24 | End: 2018-01-27

## 2018-01-24 RX ORDER — NALOXONE HYDROCHLORIDE 0.4 MG/ML
80 INJECTION, SOLUTION INTRAMUSCULAR; INTRAVENOUS; SUBCUTANEOUS AS NEEDED
Status: ACTIVE | OUTPATIENT
Start: 2018-01-24 | End: 2018-01-24

## 2018-01-24 RX ORDER — HYDROMORPHONE HYDROCHLORIDE 1 MG/ML
1.2 INJECTION, SOLUTION INTRAMUSCULAR; INTRAVENOUS; SUBCUTANEOUS EVERY 2 HOUR PRN
Status: DISCONTINUED | OUTPATIENT
Start: 2018-01-24 | End: 2018-01-28

## 2018-01-24 RX ORDER — ASPIRIN 325 MG
325 TABLET, DELAYED RELEASE (ENTERIC COATED) ORAL DAILY
Status: DISCONTINUED | OUTPATIENT
Start: 2018-01-24 | End: 2018-01-28

## 2018-01-24 RX ORDER — ONDANSETRON 2 MG/ML
4 INJECTION INTRAMUSCULAR; INTRAVENOUS EVERY 6 HOURS PRN
Status: DISCONTINUED | OUTPATIENT
Start: 2018-01-24 | End: 2018-01-28

## 2018-01-24 RX ORDER — AMIODARONE HYDROCHLORIDE 200 MG/1
200 TABLET ORAL DAILY
Status: DISCONTINUED | OUTPATIENT
Start: 2018-01-25 | End: 2018-01-28

## 2018-01-24 RX ORDER — PANTOPRAZOLE SODIUM 20 MG/1
20 TABLET, DELAYED RELEASE ORAL
Status: DISCONTINUED | OUTPATIENT
Start: 2018-01-25 | End: 2018-01-28

## 2018-01-24 NOTE — H&P
VASCULAR SURGERY HP        Name: Betty Marti   :   1940  [id number]      REFERRING PHYSICIAN: Renetta Parker MD     HISTORY OF PRESENT ILLNESS:   Patient is a 65year old female who has been referred every 8 (eight) hours as needed for Pain., Disp: 15 tablet, Rfl: 0  •  amiodarone HCl 200 MG Oral Tab, Take 1 tablet (200 mg total) by mouth daily. , Disp: 30 tablet, Rfl: 3  •  metoprolol Tartrate 25 MG Oral Tab, Take 0.5 tablets (12.5 mg total) by mouth 2 the HPI.     EXAM:     Ht 5' (1.524 m)   Wt 117 lb (53.1 kg)   BMI 22.85 kg/m²   GENERAL: alert and orientated X 3, well developed, well nourished, in no apparent distress  PSYCH: normal mood and affect  HEENT: ears and throat are clear  NECK: supple, no l

## 2018-01-24 NOTE — PLAN OF CARE
1430-Admitted to  6621 s/p rt ileofemoral endarterectomy with pericardial patch. VS, groin & PV cks per protocol. C/O chronic back pain but not surgical pain. Alert & oriented. Follows commands. RSR per monitor, SBP>90. Taking ice chips. Ariana naranjo,

## 2018-01-24 NOTE — ANESTHESIA PREPROCEDURE EVALUATION
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 Disp:  Rfl:    Fluticasone Propionate 50 MCG/ACT Nasal Suspension by Each Nare route daily as needed for Rhinitis.  Disp:  Rfl:    Sennosides-Docusate Sodium (SENEXON-S OR) Take 2 tablets by mouth daily as n Past Surgical History:  No date: CABG     Smoking status: Former Smoker     Smokeless tobacco: Never Used    Alcohol use Yes    Comment: occational , but not for last 6 yrs       Drug use: No     Available pre-op labs reviewed.     Lab Results

## 2018-01-24 NOTE — BRIEF OP NOTE
Patients Name: Ankush Claros  Attending Physician: Zully Ochoa MD  Operating Physician: Scooby Arceo MD  CSN: [id number]     Location:  OR  MRN: [id number]    YOB: 1940  Admission Date: 1/24/2018  Operation Date: 1/24/2018    Andrea Myers

## 2018-01-24 NOTE — ANESTHESIA POSTPROCEDURE EVALUATION
[address] Patient Status:  Inpatient   Age/Gender 77year old female MRN [id number]   [facility] 6NE-A Attending Rola Nicole MD   Hosp Day # 0 PCP Efren Hargrove MD       Anesthesia Post-op Note    Procedure(s):

## 2018-01-25 LAB
ATRIAL RATE: 72 BPM
BUN BLD-MCNC: 21 MG/DL (ref 8–20)
CALCIUM BLD-MCNC: 7.4 MG/DL (ref 8.3–10.3)
CHLORIDE: 111 MMOL/L (ref 101–111)
CO2: 23 MMOL/L (ref 22–32)
CREAT BLD-MCNC: 1.29 MG/DL (ref 0.55–1.02)
ERYTHROCYTE [DISTWIDTH] IN BLOOD BY AUTOMATED COUNT: 15.4 % (ref 11.5–16)
GLUCOSE BLD-MCNC: 132 MG/DL (ref 70–99)
HCT VFR BLD AUTO: 24.5 % (ref 34–50)
HGB BLD-MCNC: 7.7 G/DL (ref 12–16)
MCH RBC QN AUTO: 26.8 PG (ref 27–33.2)
MCHC RBC AUTO-ENTMCNC: 31.4 G/DL (ref 31–37)
MCV RBC AUTO: 85.4 FL (ref 81–100)
P AXIS: 40 DEGREES
P-R INTERVAL: 178 MS
PLATELET # BLD AUTO: 212 10(3)UL (ref 150–450)
POTASSIUM SERPL-SCNC: 4.1 MMOL/L (ref 3.6–5.1)
Q-T INTERVAL: 458 MS
QRS DURATION: 138 MS
QTC CALCULATION (BEZET): 501 MS
R AXIS: -45 DEGREES
RBC # BLD AUTO: 2.87 X10(6)UL (ref 3.8–5.1)
RED CELL DISTRIBUTION WIDTH-SD: 48.3 FL (ref 35.1–46.3)
RETIC ABS CALC AUTO: 52.9 X10(3) UL (ref 22.5–147.5)
RETIC IRF CALC: 0.26 RATIO (ref 0.09–0.3)
RETIC%: 1.8 % (ref 0.5–2.5)
RETICULOCYTE HEMOGLOBIN EQUIVALENT: 21.3 PG (ref 28.2–36.3)
SODIUM SERPL-SCNC: 142 MMOL/L (ref 136–144)
T AXIS: 52 DEGREES
VENTRICULAR RATE: 72 BPM
WBC # BLD AUTO: 8.7 X10(3) UL (ref 4–13)

## 2018-01-25 PROCEDURE — 99232 SBSQ HOSP IP/OBS MODERATE 35: CPT | Performed by: HOSPITALIST

## 2018-01-25 RX ORDER — SODIUM CHLORIDE 9 MG/ML
INJECTION, SOLUTION INTRAVENOUS CONTINUOUS
Status: DISCONTINUED | OUTPATIENT
Start: 2018-01-25 | End: 2018-01-26

## 2018-01-25 RX ORDER — DOCUSATE SODIUM 100 MG/1
100 CAPSULE, LIQUID FILLED ORAL 2 TIMES DAILY
Status: DISCONTINUED | OUTPATIENT
Start: 2018-01-25 | End: 2018-01-28

## 2018-01-25 NOTE — PROGRESS NOTES
[facility] H[facility]PITALIST  Progress Note     Roni Grief Patient Status:  Inpatient    1940 MRN [id number]   [facility] 6NE-A Attending Melody Tuttle MD   Hosp Day # 1 PCP Arturo Lawson MD     Chief Complaint: medical management enoxaparin  30 mg Subcutaneous Daily   • aspirin  325 mg Oral Daily   • aspirin  300 mg Rectal Daily   • Pantoprazole Sodium  20 mg Oral QAM AC   • gabapentin  600 mg Oral BID   • atorvastatin  10 mg Oral Nightly   • amiodarone HCl  200 mg Oral Daily

## 2018-01-25 NOTE — CONSULTS
NAYAN HOSPITALIST  CONSULT     Alyssia Cheek Patient Status:  Inpatient    1940 MRN [id number]   [facility] 6NE-A Attending Yuliya Au MD   Hosp Day # 0 PCP Shilpi Green MD     Reason for consult: med mgt    Requested by Disp:  Rfl:    ALPRAZolam 0.25 MG Oral Tab Take 0.25 mg by mouth 2 (two) times daily as needed. Disp:  Rfl:    HYDROcodone-acetaminophen 5-325 MG Oral Tab Take 1 tablet by mouth every 8 (eight) hours as needed for Pain.  (Patient taking differently: Take pulses. Abdomen: Soft, nontender, nondistended. Musculoskeletal:dry gangrene right 5th toe  Extremities: No edema or cyanosis. Groin site c/d/i  Integument: No rashes or lesions.        Diagnostic Data:      Labs:  Recent Labs   Lab  01/24/18   0916  0

## 2018-01-25 NOTE — PLAN OF CARE
Problem: Patient/Family Goals  Goal: Patient/Family Long Term Goal  Patient's Long Term Goal: d/c to LTC facility  Interventions:  - PT/ OT as ordered  - See additional Care Plan goals for specific interventions    Outcome: Progressing    Goal: Patient/Fam will be removed in the am. Since she is wheelchair bound by history, she was informed of the different ways of getting to the bathroom here in the hospital, also of the use of the bedside commode. She verbalized understanding.  Patient's DNR status prior to

## 2018-01-25 NOTE — PHYSICAL THERAPY NOTE
PHYSICAL THERAPY QUICK EVALUATION - INPATIENT    Room Number: 6088/4408-U  Evaluation Date: 1/25/2018  Presenting Problem: s/p R iliofemoral endarterectomy 1/24/18  Physician Order: PT Eval and Treat    Problem List  Active Problems:    Gangrene of right (including adjusting bedclothes, sheets and blankets)?: A Little   -   Sitting down on and standing up from a chair with arms (e.g., wheelchair, bedside commode, etc.): A Little   -   Moving from lying on back to sitting on the side of the bed?: A Little Discharge Recommendations: Skilled nursing facility    PLAN  Patient has been evaluated and presents with no skilled Physical Therapy needs at this time. Patient discharged from Physical Therapy services.  Please re-order if a new functional limitation pre

## 2018-01-25 NOTE — CM/SW NOTE
01/25/18 1100   CM/SW Referral Data   Referral Source Social Work (self-referral)   Reason for Referral Discharge planning   Informant Patient; Children   Pertinent Medical Hx   Primary Care Physician Name (Dr. Kevyn Troy)   Patient Info   Patient's Mental S

## 2018-01-25 NOTE — OCCUPATIONAL THERAPY NOTE
OCCUPATIONAL THERAPY QUICK EVALUATION - INPATIENT    Room Number: 7644/4516-R  Evaluation Date: 1/25/2018     Type of Evaluation: Initial  Presenting Problem: Fem Endarterectomy with patch    Physician Order: IP Consult to Occupational Therapy  Reason for NEUROLOGICAL FINDINGS                   ACTIVITIES OF DAILY LIVING ASSESSMENT  AM-PAC ‘6-Clicks’ Inpatient Daily Activity Short Form   How much help from another person does the patient currently need…  -   Putting on and taking off regular lower body comorbidities nor modifications of tasks    Clinical Decision Making  LOW - Analysis of occupational profile, problem-focused assessments, limited treatment options    Overall Complexity  LOW     OT Discharge Recommendations: Skilled nursing facility; Other

## 2018-01-25 NOTE — PLAN OF CARE
0800 Assisted pt up to chair, tolerated well. Right femoral drsg d/i soft to touch but area raised a little. doppler pedal pulses. Norco given for c/o pain to right hip.0900 Called Dr Blackburn Blood concerning pt's sbp in the 80's and hgb 7.7.  Noted to hold on bloo

## 2018-01-25 NOTE — PROGRESS NOTES
[facility] Pharmacy Note:  Renal Adjustment for cefazolin (ANCEF)    Sesar Rubalcava is a 77year old female who has been prescribed cefazolin (ANCEF) 2g q8hr x2 doses.       Est CrCl ~ 25 mL/min    The dose has been adjusted to cefazolin (ANCEF) 2g q12hr per hosp

## 2018-01-25 NOTE — PROGRESS NOTES
[facility] Pharmacy Note:  Renal Dose Adjustment for Enoxaparin (LOVENOX)    Betty Macedo has been prescribed Enoxaparin (LOVENOX) 40 mg subcutaneously every 24 hours. Estimated Creatinine Clearance: 25.1 mL/min (based on SCr of 1.47 mg/dL (H)).     Her calc

## 2018-01-26 LAB
BASOPHILS # BLD AUTO: 0.07 X10(3) UL (ref 0–0.1)
BASOPHILS NFR BLD AUTO: 0.8 %
EOSINOPHIL # BLD AUTO: 0.11 X10(3) UL (ref 0–0.3)
EOSINOPHIL NFR BLD AUTO: 1.3 %
ERYTHROCYTE [DISTWIDTH] IN BLOOD BY AUTOMATED COUNT: 15.7 % (ref 11.5–16)
HCT VFR BLD AUTO: 28 % (ref 34–50)
HGB BLD-MCNC: 8.3 G/DL (ref 12–16)
IMMATURE GRANULOCYTE COUNT: 0.05 X10(3) UL (ref 0–1)
IMMATURE GRANULOCYTE RATIO %: 0.6 %
LYMPHOCYTES # BLD AUTO: 1.12 X10(3) UL (ref 0.9–4)
LYMPHOCYTES NFR BLD AUTO: 13.3 %
MCH RBC QN AUTO: 26.9 PG (ref 27–33.2)
MCHC RBC AUTO-ENTMCNC: 29.6 G/DL (ref 31–37)
MCV RBC AUTO: 90.9 FL (ref 81–100)
MONOCYTES # BLD AUTO: 1.32 X10(3) UL (ref 0.1–0.6)
MONOCYTES NFR BLD AUTO: 15.6 %
NEUTROPHIL ABS PRELIM: 5.77 X10 (3) UL (ref 1.3–6.7)
NEUTROPHILS # BLD AUTO: 5.77 X10(3) UL (ref 1.3–6.7)
NEUTROPHILS NFR BLD AUTO: 68.4 %
PLATELET # BLD AUTO: 224 10(3)UL (ref 150–450)
RBC # BLD AUTO: 3.08 X10(6)UL (ref 3.8–5.1)
RED CELL DISTRIBUTION WIDTH-SD: 52.2 FL (ref 35.1–46.3)
WBC # BLD AUTO: 8.4 X10(3) UL (ref 4–13)

## 2018-01-26 PROCEDURE — 99232 SBSQ HOSP IP/OBS MODERATE 35: CPT | Performed by: HOSPITALIST

## 2018-01-26 RX ORDER — ECHINACEA PURPUREA EXTRACT 125 MG
1 TABLET ORAL
Status: DISCONTINUED | OUTPATIENT
Start: 2018-01-26 | End: 2018-01-28

## 2018-01-26 NOTE — PLAN OF CARE
Received patient today from CCU as a transfer. Alert and oriented x3 and denying any pain. Patient started having a large nose bleed. Pressure was held, Dr. Tyrese Donaldson notified and came to the bedside. Afrin spray given. Bleeding has stopped.  Patient updated o

## 2018-01-26 NOTE — PROGRESS NOTES
Doing ok except for bloody nose  Sprayed afrin and replaced clamp  Right groin incision clean and intact  Doppler pedal signals    PT eval tomorrow

## 2018-01-26 NOTE — PLAN OF CARE
Pt A/Ox4, on RA, NSR/SB per tele, denies any pain  Pt up with stand-by assist, pivot to wheelchair  Rt groin, guaze and tegaderm in place, C/D/I. Tender to touch.   No nose bleeds noted  PLAN: IVF d/c'd  Will cont to monitor    CARDIOVASCULAR - ADULT    • M

## 2018-01-26 NOTE — PLAN OF CARE
Assumed care of pt 1930. Nasal clamp in place. Clamp removed 2200. Pt tolerated well, no SS of bleeding noted. POD #2 Rt ileolfemoral endarterectomy with bovine patch. Rt groin/upper thigh with guaze dressing C/D/I. Site soft, tender to touch.  Assessment u

## 2018-01-26 NOTE — PROGRESS NOTES
[facility] HOSPITALIST  Progress Note     Demetra Aldrich Patient Status:  Inpatient    1940 MRN [id number]   [facility] 6NE-A Attending Kyle Lockhart MD   Hosp Day # 2 PCP Elva Marcelo MD     Chief Complaint: medical management Intravenous Once   • enoxaparin  30 mg Subcutaneous Daily   • aspirin  325 mg Oral Daily   • aspirin  300 mg Rectal Daily   • Pantoprazole Sodium  20 mg Oral QAM AC   • gabapentin  600 mg Oral BID   • atorvastatin  10 mg Oral Nightly   • amiodarone HCl  20

## 2018-01-27 LAB
BASOPHILS # BLD AUTO: 0.07 X10(3) UL (ref 0–0.1)
BASOPHILS NFR BLD AUTO: 0.9 %
EOSINOPHIL # BLD AUTO: 0.2 X10(3) UL (ref 0–0.3)
EOSINOPHIL NFR BLD AUTO: 2.7 %
ERYTHROCYTE [DISTWIDTH] IN BLOOD BY AUTOMATED COUNT: 15.6 % (ref 11.5–16)
HCT VFR BLD AUTO: 25.9 % (ref 34–50)
HGB BLD-MCNC: 7.8 G/DL (ref 12–16)
IMMATURE GRANULOCYTE COUNT: 0.02 X10(3) UL (ref 0–1)
IMMATURE GRANULOCYTE RATIO %: 0.3 %
LYMPHOCYTES # BLD AUTO: 1.38 X10(3) UL (ref 0.9–4)
LYMPHOCYTES NFR BLD AUTO: 18.6 %
MCH RBC QN AUTO: 26.7 PG (ref 27–33.2)
MCHC RBC AUTO-ENTMCNC: 30.1 G/DL (ref 31–37)
MCV RBC AUTO: 88.7 FL (ref 81–100)
MONOCYTES # BLD AUTO: 1.29 X10(3) UL (ref 0.1–0.6)
MONOCYTES NFR BLD AUTO: 17.4 %
NEUTROPHIL ABS PRELIM: 4.46 X10 (3) UL (ref 1.3–6.7)
NEUTROPHILS # BLD AUTO: 4.46 X10(3) UL (ref 1.3–6.7)
NEUTROPHILS NFR BLD AUTO: 60.1 %
PLATELET # BLD AUTO: 212 10(3)UL (ref 150–450)
RBC # BLD AUTO: 2.92 X10(6)UL (ref 3.8–5.1)
RED CELL DISTRIBUTION WIDTH-SD: 51.2 FL (ref 35.1–46.3)
WBC # BLD AUTO: 7.4 X10(3) UL (ref 4–13)

## 2018-01-27 PROCEDURE — 99232 SBSQ HOSP IP/OBS MODERATE 35: CPT | Performed by: HOSPITALIST

## 2018-01-27 NOTE — PROGRESS NOTES
[facility]  Vascular Surgery Progress Note    Rosalea Arm Patient Status:  Inpatient    1940 MRN [id number]   [facility] 7NE-A Attending Yuliya Au MD   Hosp Day # 3 PCP Shilpi Green MD     Objective:   Temp: 98.5 °F 300 MG rectal suppository 300 mg 300 mg Rectal Daily   ondansetron HCl (ZOFRAN) injection 4 mg 4 mg Intravenous Q6H PRN   HYDROmorphone HCl PF (DILAUDID) 1 MG/ML injection 0.4 mg 0.4 mg Intravenous Q2H PRN   Or      HYDROmorphone HCl PF (DILAUDID) 1 MG/ML

## 2018-01-27 NOTE — PROGRESS NOTES
Doing well. No further nosebleeds. Dressing removed and Dermabond is intact  Foot is warm.   Can be discharged tomorrow

## 2018-01-27 NOTE — PLAN OF CARE
Pt A/O X4. RA. NSR, SB on tele. RT groin site soft, tender, no hematoma; skin glued, LUIS ANGEL. BL Pedal pulses present with doppler. RT small toe gangrenous. Denies pain. Pt is sleeping comfortably. Will continue to monitor.      CARDIOVASCULAR - ADULT    • Main

## 2018-01-27 NOTE — PLAN OF CARE
NURSING DISCHARGE NOTE    Discharged {DISCHARGED:4296} via {DISCHARGED VIA:4297}. Accompanied by {ACCOMPANIED BY:4298}  Belongings {BELONGINGS:4299}.         CARDIOVASCULAR - ADULT    • Maintains optimal cardiac output and hemodynamic stability Adequat

## 2018-01-27 NOTE — PLAN OF CARE
HEMATOLOGIC - ADULT    • Maintains hematologic stability Progressing        Received patient a 1400. Patient alert and oriented x4. Describes some discomfort at old endarectomy site. Hematoma noted. MD aware, we are monitoring. Patient sleeping right now.

## 2018-01-27 NOTE — PLAN OF CARE
Patient called tech c/o throbbing in right groin  Site large, bruised with hematoma  Pressure applied and swelling went down  Pulses intact via doppler  Dr. Autumn Call notified, discharge held until tomorrow   Nursing will continue to monitor

## 2018-01-27 NOTE — PROGRESS NOTES
[facility]PITAL[facility]  Progress Note     Demetra Aldrich Patient Status:  Inpatient    1940 MRN [id number]   [facility] 6NE-A Attending Kyle Lockhart MD   Hosp Day # 3 PCP Elva Marcelo MD     Chief Complaint: medical management HCl  200 mg Oral Daily       ASSESSMENT / PLAN:     1. S/p right iliofemoral endarterectomy with bovine patch repair  1. Per Vascular Sx-  ASA +/- pentoxyfylline?- per vascular  2. Cad s/p cabg- asa  3. Hypotension  1. Resolved  4. Hl-  1. statin  5.  Polyc

## 2018-01-27 NOTE — CM/SW NOTE
Final dc orders received. sw confirmed bed at [facility]. Pt has [facility]- contacted [employer] [phone] [facility] transportation) and spoke with representative re: above need. [employer] will have to arrange through their providers.  He will arrange for

## 2018-01-28 VITALS
HEART RATE: 61 BPM | OXYGEN SATURATION: 97 % | TEMPERATURE: 98 F | DIASTOLIC BLOOD PRESSURE: 73 MMHG | BODY MASS INDEX: 23.98 KG/M2 | RESPIRATION RATE: 18 BRPM | SYSTOLIC BLOOD PRESSURE: 112 MMHG | HEIGHT: 57 IN | WEIGHT: 111.13 LBS

## 2018-01-28 LAB
BASOPHILS # BLD AUTO: 0.05 X10(3) UL (ref 0–0.1)
BASOPHILS NFR BLD AUTO: 0.6 %
BLOOD TYPE BARCODE: 5100
EOSINOPHIL # BLD AUTO: 0.25 X10(3) UL (ref 0–0.3)
EOSINOPHIL NFR BLD AUTO: 3 %
ERYTHROCYTE [DISTWIDTH] IN BLOOD BY AUTOMATED COUNT: 15.6 % (ref 11.5–16)
HCT VFR BLD AUTO: 31.1 % (ref 34–50)
HGB BLD-MCNC: 9.4 G/DL (ref 12–16)
IMMATURE GRANULOCYTE COUNT: 0.02 X10(3) UL (ref 0–1)
IMMATURE GRANULOCYTE RATIO %: 0.2 %
LYMPHOCYTES # BLD AUTO: 1.34 X10(3) UL (ref 0.9–4)
LYMPHOCYTES NFR BLD AUTO: 16.2 %
MCH RBC QN AUTO: 26.9 PG (ref 27–33.2)
MCHC RBC AUTO-ENTMCNC: 30.2 G/DL (ref 31–37)
MCV RBC AUTO: 88.9 FL (ref 81–100)
MONOCYTES # BLD AUTO: 1.16 X10(3) UL (ref 0.1–0.6)
MONOCYTES NFR BLD AUTO: 14 %
NEUTROPHIL ABS PRELIM: 5.46 X10 (3) UL (ref 1.3–6.7)
NEUTROPHILS # BLD AUTO: 5.46 X10(3) UL (ref 1.3–6.7)
NEUTROPHILS NFR BLD AUTO: 66 %
PLATELET # BLD AUTO: 253 10(3)UL (ref 150–450)
RBC # BLD AUTO: 3.5 X10(6)UL (ref 3.8–5.1)
RED CELL DISTRIBUTION WIDTH-SD: 50.7 FL (ref 35.1–46.3)
WBC # BLD AUTO: 8.3 X10(3) UL (ref 4–13)

## 2018-01-28 PROCEDURE — 99232 SBSQ HOSP IP/OBS MODERATE 35: CPT | Performed by: HOSPITALIST

## 2018-01-28 RX ORDER — HYDROCODONE BITARTRATE AND ACETAMINOPHEN 5; 325 MG/1; MG/1
1-2 TABLET ORAL
Qty: 20 TABLET | Refills: 1 | Status: SHIPPED | OUTPATIENT
Start: 2018-01-28 | End: 2018-02-07

## 2018-01-28 NOTE — PROGRESS NOTES
NAY[facility] HOSPITALIST  Progress Note     Mode Keys Patient Status:  Inpatient    1940 MRN [id number]   [facility] 6NE-A Attending Rio Mathias MD   Hosp Day # 4 PCP Ami Galdamez MD     Chief Complaint: medical management amiodarone HCl  200 mg Oral Daily       ASSESSMENT / PLAN:     1. S/p right iliofemoral endarterectomy with bovine patch repair  1. Per Vascular Sx-  ASA   2. Developed groin hematoma- appears much better today  2. Cad s/p cabg- asa  3. Hypotension  1.  Res

## 2018-01-28 NOTE — PLAN OF CARE
AOx4, VSS on RA  NSR per tele  C/O of back pain relieved with NORCO  Right groin hematoma and bruising stable   Pulses by doppler  Due medications given, needs attended, will continue to monitor.     0600: C/o of burning sensation coming from blister on lef

## 2018-01-28 NOTE — CM/SW NOTE
Discharge orders received. JASON spoke with admissions at [facility] and confirmed bed available for transfer today. Updates sent via [address]. Pt has a [facility] insurance plan which requires transport be arranged through [employer].  JASON contacted [employer] [phone]

## 2018-01-28 NOTE — PLAN OF CARE
CARDIOVASCULAR - ADULT    • Maintains optimal cardiac output and hemodynamic stability Adequate for Discharge        HEMATOLOGIC - ADULT    • Maintains hematologic stability Adequate for Discharge        Impaired Activities of Daily Living    • Achieve hig

## 2018-01-28 NOTE — PROGRESS NOTES
[facility]  Vascular Surgery Progress Note    Redia White Heath Patient Status:  Inpatient    1940 MRN [id number]   [facility] 7NE-A Attending Rola Nicole MD   Hosp Day # 4 PCP Efren Hargrove MD     Objective:   Temp: 98. 4 (SALINE MIST) 1 spray 1 spray Each Nare Q3H PRN   Oxymetazoline HCl (NASAL DECONGESTANT SPRAY) 0.05 % nasal spray 1 spray 1 spray Each Nare Q12H PRN   docusate sodium (COLACE) cap 100 mg 100 mg Oral BID   HYDROmorphone HCl PF (DILAUDID) 1 MG/ML injection 0

## 2018-02-11 NOTE — OPERATIVE REPORT
[facility]    PATIENT'S NAME: PAVEL SUBRAMANIAN KUSHAL   ATTENDING PHYSICIAN: Belkis Dawson M.D. OPERATING PHYSICIAN: Belkis Dawson M.D.    PATIENT ACCOUNT#:   [de-identified]    LOCATION:  [address] [place] [address]  MEDICAL RECORD #:   [id number]       DATE OF BIRTH general endotracheal anesthesia, the right groin area was then prepped and draped in the usual surgical sterile fashion. A vertical skin incision was then performed overlying the area of calcified right common femoral artery.   Dissection was then carried forceps. The superficial femoral artery ostium intima was then tacked using 7-0 Prolene suture and so was the intima of the profunda femoris artery. Then, a bovine patch was then brought into the field.   It was irrigated according to instructions and was

## 2018-02-11 NOTE — OPERATIVE REPORT
[address]    PATIENT'S NAME: MARILYNN PAVEL A   ATTENDING PHYSICIAN: Belkis Linares M.D. OPERATING PHYSICIAN: Belkis Linares M.D.    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   The risks and benefits of the procedure were discussed with her in detail and those included risk of bleeding, dissection, embol iliac artery and we used a 7 x 60 mm long stent and postplastied it with a 7 mm [place] balloon. Then, we performed an angiogram of the right lower extremity.   The femoral sheath was near occlusive within the common femoral artery, and therefore, the orig

## 2018-02-11 NOTE — DISCHARGE SUMMARY
[facility]  Discharge Summary    Luis E Valverde Patient Status:  Inpatient    1940 MRN [id number]   [facility] 7NE-A Attending No att. providers found   Hosp Day # 4 PCP Shayy Sol MD     Date of Admission: 2018    Phi breakfast., Print Script, Disp-30 tablet, R-2    Multiple Vitamin (ONE-DAILY MULTI VITAMINS) Oral Tab  Take 1 tablet by mouth daily. , Historical    ALPRAZolam 0.25 MG Oral Tab  Take 0.25 mg by mouth 2 (two) times daily as needed.  , Historical    HYDROcodo

## 2018-02-12 PROBLEM — I70.209 ATHEROSCLEROSIS OF ARTERIES OF EXTREMITIES (HCC): Status: ACTIVE | Noted: 2018-02-12

## 2018-02-12 PROBLEM — B35.6 FUNGAL INFECTION OF THE GROIN: Status: ACTIVE | Noted: 2018-02-12

## 2018-03-01 ENCOUNTER — APPOINTMENT (OUTPATIENT)
Dept: GENERAL RADIOLOGY | Facility: HOSPITAL | Age: 78
DRG: 177 | End: 2018-03-01
Attending: EMERGENCY MEDICINE
Payer: MEDICARE

## 2018-03-01 ENCOUNTER — APPOINTMENT (OUTPATIENT)
Dept: ULTRASOUND IMAGING | Facility: HOSPITAL | Age: 78
DRG: 177 | End: 2018-03-01
Attending: INTERNAL MEDICINE
Payer: MEDICARE

## 2018-03-01 ENCOUNTER — HOSPITAL ENCOUNTER (INPATIENT)
Facility: HOSPITAL | Age: 78
LOS: 8 days | Discharge: HOSPICE | DRG: 177 | End: 2018-03-09
Attending: EMERGENCY MEDICINE | Admitting: HOSPITALIST
Payer: MEDICARE

## 2018-03-01 DIAGNOSIS — R77.8 ELEVATED TROPONIN: ICD-10-CM
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
## 2018-03-01 DIAGNOSIS — N28.9 RENAL INSUFFICIENCY: ICD-10-CM

## 2018-03-01 DIAGNOSIS — D52.0 DIETARY FOLATE DEFICIENCY ANEMIA: ICD-10-CM

## 2018-03-01 DIAGNOSIS — D64.9 ANEMIA, UNSPECIFIED TYPE: ICD-10-CM

## 2018-03-01 DIAGNOSIS — I50.9 ACUTE ON CHRONIC CONGESTIVE HEART FAILURE, UNSPECIFIED HEART FAILURE TYPE (HCC): Primary | ICD-10-CM

## 2018-03-01 DIAGNOSIS — J18.9 NOSOCOMIAL PNEUMONIA: ICD-10-CM

## 2018-03-01 DIAGNOSIS — D50.9 IRON DEFICIENCY ANEMIA, UNSPECIFIED IRON DEFICIENCY ANEMIA TYPE: ICD-10-CM

## 2018-03-01 DIAGNOSIS — R09.02 HYPOXIA: ICD-10-CM

## 2018-03-01 LAB
ADENOVIRUS PCR:: NEGATIVE
ALBUMIN SERPL-MCNC: 2.3 G/DL (ref 3.5–4.8)
ALLENS TEST: POSITIVE
ALP LIVER SERPL-CCNC: 83 U/L (ref 55–142)
ALT SERPL-CCNC: 9 U/L (ref 14–54)
ANTIBODY SCREEN: NEGATIVE
APTT PPP: 46.5 SECONDS (ref 25–34)
ARTERIAL BLD GAS O2 SATURATION: 95 % (ref 92–100)
ARTERIAL BLOOD GAS BASE EXCESS: -3.4
ARTERIAL BLOOD GAS HCO3: 20.5 MEQ/L (ref 22–26)
ARTERIAL BLOOD GAS PCO2: 32 MM HG (ref 35–45)
ARTERIAL BLOOD GAS PH: 7.43 (ref 7.35–7.45)
ARTERIAL BLOOD GAS PO2: 84 MM HG (ref 80–105)
AST SERPL-CCNC: 28 U/L (ref 15–41)
ATRIAL RATE: 84 BPM
B PERT DNA SPEC QL NAA+PROBE: NEGATIVE
BASOPHILS # BLD AUTO: 0.05 X10(3) UL (ref 0–0.1)
BASOPHILS NFR BLD AUTO: 0.3 %
BILIRUB SERPL-MCNC: 0.5 MG/DL (ref 0.1–2)
BILIRUB UR QL STRIP.AUTO: NEGATIVE
BUN BLD-MCNC: 34 MG/DL (ref 8–20)
C PNEUM DNA SPEC QL NAA+PROBE: NEGATIVE
CALCIUM BLD-MCNC: 8.6 MG/DL (ref 8.3–10.3)
CALCULATED O2 SATURATION: 97 % (ref 92–100)
CARBOXYHEMOGLOBIN: 1.6 % SAT (ref 0–3)
CHLORIDE: 109 MMOL/L (ref 101–111)
CLARITY UR REFRACT.AUTO: CLEAR
CO2: 23 MMOL/L (ref 22–32)
COLOR UR AUTO: YELLOW
CORONAVIRUS 229E PCR:: NEGATIVE
CORONAVIRUS HKU1 PCR:: NEGATIVE
CORONAVIRUS NL63 PCR:: NEGATIVE
CORONAVIRUS OC43 PCR:: NEGATIVE
CREAT BLD-MCNC: 1.55 MG/DL (ref 0.55–1.02)
EOSINOPHIL # BLD AUTO: 0.27 X10(3) UL (ref 0–0.3)
EOSINOPHIL NFR BLD AUTO: 1.7 %
ERYTHROCYTE [DISTWIDTH] IN BLOOD BY AUTOMATED COUNT: 17 % (ref 11.5–16)
FLUAV RNA SPEC QL NAA+PROBE: NEGATIVE
FLUBV RNA SPEC QL NAA+PROBE: NEGATIVE
GLUCOSE BLD-MCNC: 107 MG/DL (ref 70–99)
GLUCOSE UR STRIP.AUTO-MCNC: NEGATIVE MG/DL
HCT VFR BLD AUTO: 24.7 % (ref 34–50)
HGB BLD-MCNC: 7.3 G/DL (ref 12–16)
IMMATURE GRANULOCYTE COUNT: 0.15 X10(3) UL (ref 0–1)
IMMATURE GRANULOCYTE RATIO %: 1 %
INR BLD: 1.32 (ref 0.89–1.11)
IONIZED CALCIUM: 1.16 MMOL/L (ref 1.12–1.32)
KETONES UR STRIP.AUTO-MCNC: NEGATIVE MG/DL
L/M: 10 L/MIN
LACTIC ACID ARTERIAL: <1.3 MMOL/L (ref 0.5–2)
LEUKOCYTE ESTERASE UR QL STRIP.AUTO: NEGATIVE
LYMPHOCYTES # BLD AUTO: 0.96 X10(3) UL (ref 0.9–4)
LYMPHOCYTES NFR BLD AUTO: 6.2 %
M PROTEIN MFR SERPL ELPH: 6.8 G/DL (ref 6.1–8.3)
MCH RBC QN AUTO: 24.3 PG (ref 27–33.2)
MCHC RBC AUTO-ENTMCNC: 29.6 G/DL (ref 31–37)
MCV RBC AUTO: 82.3 FL (ref 81–100)
METAPNEUMOVIRUS PCR:: NEGATIVE
METHEMOGLOBIN: 0.5 % SAT (ref 0.4–1.5)
MONOCYTES # BLD AUTO: 1.21 X10(3) UL (ref 0.1–1)
MONOCYTES NFR BLD AUTO: 7.8 %
MYCOPLASMA PNEUMONIA PCR:: NEGATIVE
NEUTROPHIL ABS PRELIM: 12.93 X10 (3) UL (ref 1.3–6.7)
NEUTROPHILS # BLD AUTO: 12.93 X10(3) UL (ref 1.3–6.7)
NEUTROPHILS NFR BLD AUTO: 83 %
NITRITE UR QL STRIP.AUTO: NEGATIVE
P AXIS: 29 DEGREES
P-R INTERVAL: 148 MS
P/F RATIO: 84.2 MMHG
PARAINFLUENZA 1 PCR:: NEGATIVE
PARAINFLUENZA 2 PCR:: NEGATIVE
PARAINFLUENZA 3 PCR:: NEGATIVE
PARAINFLUENZA 4 PCR:: NEGATIVE
PATIENT TEMPERATURE: 98.6 F
PH UR STRIP.AUTO: 5.5 [PH] (ref 4.5–8)
PLATELET # BLD AUTO: 309 10(3)UL (ref 150–450)
POTASSIUM BLOOD GAS: 3.5 MMOL/L (ref 3.6–5.1)
POTASSIUM SERPL-SCNC: 4 MMOL/L (ref 3.6–5.1)
PRO-BETA NATRIURETIC PEPTIDE: ABNORMAL PG/ML (ref ?–450)
PROCALCITONIN SERPL-MCNC: 0.71 NG/ML (ref ?–0.11)
PSA SERPL DL<=0.01 NG/ML-MCNC: 16.4 SECONDS (ref 12–14.3)
Q-T INTERVAL: 416 MS
QRS DURATION: 140 MS
QTC CALCULATION (BEZET): 491 MS
R AXIS: -44 DEGREES
RBC # BLD AUTO: 3 X10(6)UL (ref 3.8–5.1)
RED CELL DISTRIBUTION WIDTH-SD: 50.6 FL (ref 35.1–46.3)
RH BLOOD TYPE: POSITIVE
RHINOVIRUS/ENTERO PCR:: NEGATIVE
RSV RNA SPEC QL NAA+PROBE: NEGATIVE
SODIUM BLOOD GAS: 141 MMOL/L (ref 136–144)
SODIUM SERPL-SCNC: 141 MMOL/L (ref 136–144)
SP GR UR STRIP.AUTO: 1.01 (ref 1–1.03)
T AXIS: 77 DEGREES
TOTAL HEMOGLOBIN: 7.2 G/DL (ref 11.7–16)
TROPONIN: 0.46 NG/ML (ref ?–0.05)
UROBILINOGEN UR STRIP.AUTO-MCNC: 0.2 MG/DL
VENTRICULAR RATE: 84 BPM
WBC # BLD AUTO: 15.6 X10(3) UL (ref 4–13)

## 2018-03-01 PROCEDURE — 71045 X-RAY EXAM CHEST 1 VIEW: CPT | Performed by: EMERGENCY MEDICINE

## 2018-03-01 PROCEDURE — 99223 1ST HOSP IP/OBS HIGH 75: CPT | Performed by: HOSPITALIST

## 2018-03-01 PROCEDURE — 93970 EXTREMITY STUDY: CPT | Performed by: INTERNAL MEDICINE

## 2018-03-01 RX ORDER — GABAPENTIN 600 MG/1
600 TABLET ORAL 2 TIMES DAILY
Status: DISCONTINUED | OUTPATIENT
Start: 2018-03-01 | End: 2018-03-09

## 2018-03-01 RX ORDER — ACETAMINOPHEN 325 MG/1
650 TABLET ORAL EVERY 4 HOURS PRN
COMMUNITY

## 2018-03-01 RX ORDER — ONDANSETRON 2 MG/ML
4 INJECTION INTRAMUSCULAR; INTRAVENOUS EVERY 6 HOURS PRN
Status: DISCONTINUED | OUTPATIENT
Start: 2018-03-01 | End: 2018-03-01

## 2018-03-01 RX ORDER — IPRATROPIUM BROMIDE AND ALBUTEROL SULFATE 2.5; .5 MG/3ML; MG/3ML
3 SOLUTION RESPIRATORY (INHALATION)
Status: DISCONTINUED | OUTPATIENT
Start: 2018-03-01 | End: 2018-03-02

## 2018-03-01 RX ORDER — ATORVASTATIN CALCIUM 10 MG/1
10 TABLET, FILM COATED ORAL NIGHTLY
Status: DISCONTINUED | OUTPATIENT
Start: 2018-03-01 | End: 2018-03-09

## 2018-03-01 RX ORDER — CHOLESTYRAMINE LIGHT 4 G/5.7G
4 POWDER, FOR SUSPENSION ORAL 2 TIMES DAILY
Status: DISCONTINUED | OUTPATIENT
Start: 2018-03-01 | End: 2018-03-09

## 2018-03-01 RX ORDER — MULTIVITAMIN
1 TABLET ORAL DAILY
COMMUNITY

## 2018-03-01 RX ORDER — HYDROCODONE BITARTRATE AND ACETAMINOPHEN 5; 325 MG/1; MG/1
1 TABLET ORAL EVERY 4 HOURS
COMMUNITY

## 2018-03-01 RX ORDER — ACETAMINOPHEN 325 MG/1
650 TABLET ORAL EVERY 4 HOURS PRN
Status: DISCONTINUED | OUTPATIENT
Start: 2018-03-01 | End: 2018-03-09

## 2018-03-01 RX ORDER — FLUTICASONE PROPIONATE 50 MCG
1 SPRAY, SUSPENSION (ML) NASAL DAILY
Status: DISCONTINUED | OUTPATIENT
Start: 2018-03-01 | End: 2018-03-01

## 2018-03-01 RX ORDER — SODIUM CHLORIDE 9 MG/ML
INJECTION, SOLUTION INTRAVENOUS ONCE
Status: COMPLETED | OUTPATIENT
Start: 2018-03-01 | End: 2018-03-01

## 2018-03-01 RX ORDER — PANTOPRAZOLE SODIUM 20 MG/1
20 TABLET, DELAYED RELEASE ORAL
Status: DISCONTINUED | OUTPATIENT
Start: 2018-03-02 | End: 2018-03-09

## 2018-03-01 RX ORDER — IPRATROPIUM BROMIDE AND ALBUTEROL SULFATE 2.5; .5 MG/3ML; MG/3ML
3 SOLUTION RESPIRATORY (INHALATION) EVERY 4 HOURS PRN
Status: DISCONTINUED | OUTPATIENT
Start: 2018-03-01 | End: 2018-03-09

## 2018-03-01 RX ORDER — DOXEPIN HYDROCHLORIDE 50 MG/1
1 CAPSULE ORAL DAILY
Status: DISCONTINUED | OUTPATIENT
Start: 2018-03-02 | End: 2018-03-09

## 2018-03-01 RX ORDER — MELATONIN
325
Status: DISCONTINUED | OUTPATIENT
Start: 2018-03-02 | End: 2018-03-09

## 2018-03-01 RX ORDER — AMIODARONE HYDROCHLORIDE 200 MG/1
200 TABLET ORAL DAILY
Status: DISCONTINUED | OUTPATIENT
Start: 2018-03-01 | End: 2018-03-09

## 2018-03-01 RX ORDER — IPRATROPIUM BROMIDE AND ALBUTEROL SULFATE 2.5; .5 MG/3ML; MG/3ML
3 SOLUTION RESPIRATORY (INHALATION) 4 TIMES DAILY
COMMUNITY
Start: 2018-02-28

## 2018-03-01 RX ORDER — ASPIRIN 325 MG
325 TABLET, DELAYED RELEASE (ENTERIC COATED) ORAL DAILY
Status: DISCONTINUED | OUTPATIENT
Start: 2018-03-01 | End: 2018-03-09

## 2018-03-01 RX ORDER — HYDROCODONE BITARTRATE AND ACETAMINOPHEN 5; 325 MG/1; MG/1
1 TABLET ORAL EVERY 4 HOURS PRN
Status: DISCONTINUED | OUTPATIENT
Start: 2018-03-01 | End: 2018-03-09

## 2018-03-01 RX ORDER — ALPRAZOLAM 0.25 MG/1
0.25 TABLET ORAL 2 TIMES DAILY PRN
Status: DISCONTINUED | OUTPATIENT
Start: 2018-03-01 | End: 2018-03-09

## 2018-03-01 RX ORDER — ALBUTEROL SULFATE 2.5 MG/3ML
SOLUTION RESPIRATORY (INHALATION) EVERY 6 HOURS PRN
Status: ON HOLD | COMMUNITY
End: 2018-03-01

## 2018-03-01 RX ORDER — FUROSEMIDE 10 MG/ML
80 INJECTION INTRAMUSCULAR; INTRAVENOUS ONCE
Status: COMPLETED | OUTPATIENT
Start: 2018-03-01 | End: 2018-03-01

## 2018-03-01 NOTE — ED NOTES
Going to room 2622, reported to [place], transport paged. Pt aware of her admission and verbalizing understanding.

## 2018-03-01 NOTE — ED INITIAL ASSESSMENT (HPI)
Pt came from [facility] in [facility] for VEGA. Recently treated for Pneumonia. x3 weeks ago and since then she's been on and off with vega. Denies fever. Given one neb treatment from the facility.

## 2018-03-01 NOTE — ED PROVIDER NOTES
Patient Seen in: [facility] 2ne-a    History   Patient presents with:  Dyspnea MEGHAN SOB (respiratory)    Stated Complaint: MEGHAN    HPI    Patient is 78-year-old female who 3 weeks ago shortness of breath diagnosed with  pneumonia started on antibiotic Device: Non-rebreather mask    Current:/96 (BP Location: Left arm)   Pulse 81   Temp 98 °F (36.7 °C)   Resp 17   Ht 152.4 cm (5')   Wt 53.1 kg   SpO2 100%   BMI 22.85 kg/m²         Physical Exam  GENERAL: Patient resting comfortably on the cart in no limits    Narrative: The aPTT Heparin Therapeutic Range is approximately 65- 104 seconds. The therapeutic range has been validated against 0.3-0.7 heparin anti-Xa units/mL.      ABG PANEL W ELECT AND LACTATE - Abnormal; Notable for the following:     AB Final result                 Please view results for these tests on the individual orders.    ABORH (BLOOD TYPE)   ANTIBODY SCREEN   RAINBOW DRAW BLUE   RAINBOW DRAW LAVENDER   RAINBOW DRAW LIGHT GREEN   RAINBOW DRAW GOLD   BLOOD CULTURE   BLOOD CULTURE chronic congestive heart failure (HCC) I50.9 3/1/2018 Unknown    Anemia, unspecified type D64.9 3/1/2018     Elevated troponin R74.8 3/1/2018     Hypoxia R09.02 3/1/2018     Nosocomial pneumonia J18.9 3/1/2018     Renal insufficiency N28.9 3/1/2018

## 2018-03-01 NOTE — CONSULTS
Jus Garber [address] [facility] [address] Note [facility]  Report of Consultation    Yessi Daniel Patient Status:  Inpatient    1940 MRN [id number]   [facility] [place] 2NE-A Attending Rosa Conley Disorder Mother    • Cancer Mother       reports that she has quit smoking. She has never used smokeless tobacco. She reports that she drinks alcohol. She reports that she does not use drugs.     Allergies:    Diovan [Valsartan]          Comment:Pt does not pain.  Integument/breast: Negative for rash, skin lesions, and pruritus. Hematologic/lymphatic: Negative for easy bruising, bleeding, and lymphadenopathy. Musculoskeletal: Negative for myalgias, arthralgias, muscle weakness.   Neurological: Negative for h hernias or HSM  LYMPHATIC: No palpable or visible lymphadenopathy in neck, axillae, groin  MSK: Normal strength and sensation in all extremities  EXT: No overt deformities, +lower extremity edema but worse on left than right, 2+ DP/PT pulses b/l     Lab Da opacities - possibly pneumonia but also pulmonary edema  · Possible pneumonia - gram negative, heatlhcare associated pneumonia  · Acute on chronic diastolic heart failure  · Asymmetric lower extremity edema - possibly related to underlying vascular disease

## 2018-03-01 NOTE — H&P
[facility]ITAL[facility]  History and Physical     Rosalea Arm Patient Status:  Inpatient    1940 MRN [id number]   [facility] 2NE-A Attending Lety Díaz MD   Hosp Day # 0 PCP Shilpi Green MD     Chief Complaint: SOB    History does not use drugs.     Family History:   Family History   Problem Relation Age of Onset   • Heart Disorder Father    • Heart Disorder Mother    • Cancer Mother        Allergies:   Diovan [Valsartan]          Comment:Pt does not remember what reaction she h arm)   Pulse 81   Temp 98 °F (36.7 °C)   Resp 17   Ht 5' (1.524 m)   Wt 117 lb (53.1 kg)   SpO2 100%   BMI 22.85 kg/m²   General: No acute distress. Alert and oriented x 3. HEENT: Normocephalic atraumatic. Moist mucous membranes. EOM-I. PERRLA. Anicteric. CABG  9. CKD          Quality:  · DVT Prophylaxis: SCDs  · CODE status: DNR  · Hussein: no    Plan of care discussed with patient    Irma Berman MD  2/0/6028          **Certification      PHYSICIAN Certification of Need for Inpatient Hospitalization - Ini

## 2018-03-02 ENCOUNTER — APPOINTMENT (OUTPATIENT)
Dept: GENERAL RADIOLOGY | Facility: HOSPITAL | Age: 78
DRG: 177 | End: 2018-03-02
Attending: INTERNAL MEDICINE
Payer: MEDICARE

## 2018-03-02 PROBLEM — D64.9 ANEMIA: Status: ACTIVE | Noted: 2018-03-01

## 2018-03-02 LAB
ALBUMIN SERPL-MCNC: 2 G/DL (ref 3.5–4.8)
ALP LIVER SERPL-CCNC: 73 U/L (ref 55–142)
ALT SERPL-CCNC: 10 U/L (ref 14–54)
AST SERPL-CCNC: 25 U/L (ref 15–41)
ATRIAL RATE: 70 BPM
BASOPHILS # BLD AUTO: 0.05 X10(3) UL (ref 0–0.1)
BASOPHILS # BLD AUTO: 0.05 X10(3) UL (ref 0–0.1)
BASOPHILS NFR BLD AUTO: 0.3 %
BASOPHILS NFR BLD AUTO: 0.4 %
BILIRUB DIRECT SERPL-MCNC: 0.2 MG/DL (ref 0.1–0.5)
BILIRUB SERPL-MCNC: 0.3 MG/DL (ref 0.1–2)
BUN BLD-MCNC: 32 MG/DL (ref 8–20)
CALCIUM BLD-MCNC: 8 MG/DL (ref 8.3–10.3)
CHLORIDE: 106 MMOL/L (ref 101–111)
CO2: 28 MMOL/L (ref 22–32)
CREAT BLD-MCNC: 1.56 MG/DL (ref 0.55–1.02)
DEPRECATED HBV CORE AB SER IA-ACNC: 626.6 NG/ML (ref 10–291)
EOSINOPHIL # BLD AUTO: 0.49 X10(3) UL (ref 0–0.3)
EOSINOPHIL # BLD AUTO: 0.68 X10(3) UL (ref 0–0.3)
EOSINOPHIL NFR BLD AUTO: 3.2 %
EOSINOPHIL NFR BLD AUTO: 5.5 %
ERYTHROCYTE [DISTWIDTH] IN BLOOD BY AUTOMATED COUNT: 16.7 % (ref 11.5–16)
ERYTHROCYTE [DISTWIDTH] IN BLOOD BY AUTOMATED COUNT: 17.2 % (ref 11.5–16)
FIBRINOGEN: 603 MG/DL (ref 200–446)
FOLATE (FOLIC ACID), SERUM: 4.3 NG/ML (ref 8.7–24)
GLUCOSE BLD-MCNC: 92 MG/DL (ref 70–99)
HAV AB SERPL IA-ACNC: 644 PG/ML (ref 193–986)
HCT VFR BLD AUTO: 22.4 % (ref 34–50)
HCT VFR BLD AUTO: 27.5 % (ref 34–50)
HGB BLD-MCNC: 6.7 G/DL (ref 12–16)
HGB BLD-MCNC: 8.4 G/DL (ref 12–16)
IMMATURE GRANULOCYTE COUNT: 0.08 X10(3) UL (ref 0–1)
IMMATURE GRANULOCYTE COUNT: 0.12 X10(3) UL (ref 0–1)
IMMATURE GRANULOCYTE RATIO %: 0.7 %
IMMATURE GRANULOCYTE RATIO %: 0.8 %
IRON SATURATION: 5 % (ref 13–45)
IRON: 10 UG/DL (ref 28–170)
LDH: 454 U/L (ref 84–246)
LYMPHOCYTES # BLD AUTO: 1.02 X10(3) UL (ref 0.9–4)
LYMPHOCYTES # BLD AUTO: 1.25 X10(3) UL (ref 0.9–4)
LYMPHOCYTES NFR BLD AUTO: 8.3 %
LYMPHOCYTES NFR BLD AUTO: 8.3 %
M PROTEIN MFR SERPL ELPH: 6.3 G/DL (ref 6.1–8.3)
MCH RBC QN AUTO: 24.6 PG (ref 27–33.2)
MCH RBC QN AUTO: 25.5 PG (ref 27–33.2)
MCHC RBC AUTO-ENTMCNC: 29.9 G/DL (ref 31–37)
MCHC RBC AUTO-ENTMCNC: 30.5 G/DL (ref 31–37)
MCV RBC AUTO: 82.4 FL (ref 81–100)
MCV RBC AUTO: 83.6 FL (ref 81–100)
MONOCYTES # BLD AUTO: 1.18 X10(3) UL (ref 0.1–1)
MONOCYTES # BLD AUTO: 1.46 X10(3) UL (ref 0.1–1)
MONOCYTES NFR BLD AUTO: 9.6 %
MONOCYTES NFR BLD AUTO: 9.7 %
NEUTROPHIL ABS PRELIM: 11.74 X10 (3) UL (ref 1.3–6.7)
NEUTROPHIL ABS PRELIM: 9.25 X10 (3) UL (ref 1.3–6.7)
NEUTROPHILS # BLD AUTO: 11.74 X10(3) UL (ref 1.3–6.7)
NEUTROPHILS # BLD AUTO: 9.25 X10(3) UL (ref 1.3–6.7)
NEUTROPHILS NFR BLD AUTO: 75.5 %
NEUTROPHILS NFR BLD AUTO: 77.7 %
P AXIS: 83 DEGREES
P-R INTERVAL: 166 MS
PLATELET # BLD AUTO: 274 10(3)UL (ref 150–450)
PLATELET # BLD AUTO: 297 10(3)UL (ref 150–450)
POTASSIUM SERPL-SCNC: 3.1 MMOL/L (ref 3.6–5.1)
POTASSIUM SERPL-SCNC: 4.7 MMOL/L (ref 3.6–5.1)
Q-T INTERVAL: 416 MS
QRS DURATION: 140 MS
QTC CALCULATION (BEZET): 449 MS
R AXIS: -50 DEGREES
RBC # BLD AUTO: 2.72 X10(6)UL (ref 3.8–5.1)
RBC # BLD AUTO: 3.29 X10(6)UL (ref 3.8–5.1)
RED CELL DISTRIBUTION WIDTH-SD: 50.6 FL (ref 35.1–46.3)
RED CELL DISTRIBUTION WIDTH-SD: 51.2 FL (ref 35.1–46.3)
RETIC ABS CALC AUTO: 90.3 X10(3) UL (ref 22.5–147.5)
RETIC IRF CALC: 0.24 RATIO (ref 0.09–0.3)
RETIC%: 2.8 % (ref 0.5–2.5)
RETICULOCYTE HEMOGLOBIN EQUIVALENT: 19.2 PG (ref 28.2–36.3)
SODIUM SERPL-SCNC: 144 MMOL/L (ref 136–144)
T AXIS: 79 DEGREES
TOTAL IRON BINDING CAPACITY: 185 UG/DL (ref 298–536)
TRANSFERRIN: 124 MG/DL (ref 200–360)
VENTRICULAR RATE: 70 BPM
WBC # BLD AUTO: 12.3 X10(3) UL (ref 4–13)
WBC # BLD AUTO: 15.1 X10(3) UL (ref 4–13)

## 2018-03-02 PROCEDURE — 71045 X-RAY EXAM CHEST 1 VIEW: CPT | Performed by: INTERNAL MEDICINE

## 2018-03-02 PROCEDURE — 30233N1 TRANSFUSION OF NONAUTOLOGOUS RED BLOOD CELLS INTO PERIPHERAL VEIN, PERCUTANEOUS APPROACH: ICD-10-PCS | Performed by: HOSPITALIST

## 2018-03-02 PROCEDURE — 99233 SBSQ HOSP IP/OBS HIGH 50: CPT | Performed by: INTERNAL MEDICINE

## 2018-03-02 RX ORDER — SODIUM CHLORIDE 9 MG/ML
INJECTION, SOLUTION INTRAVENOUS ONCE
Status: DISCONTINUED | OUTPATIENT
Start: 2018-03-02 | End: 2018-03-09

## 2018-03-02 RX ORDER — POTASSIUM CHLORIDE 20 MEQ/1
40 TABLET, EXTENDED RELEASE ORAL EVERY 4 HOURS
Status: COMPLETED | OUTPATIENT
Start: 2018-03-02 | End: 2018-03-02

## 2018-03-02 RX ORDER — IPRATROPIUM BROMIDE AND ALBUTEROL SULFATE 2.5; .5 MG/3ML; MG/3ML
3 SOLUTION RESPIRATORY (INHALATION)
Status: DISCONTINUED | OUTPATIENT
Start: 2018-03-02 | End: 2018-03-09

## 2018-03-02 NOTE — CONSULTS
[facility]  Report of Consultation    Deepika Rivas Patient Status:  Inpatient    1940 MRN [id number]   [facility] 2NE-A Attending Stephen Chaudhry MD   Hosp Day # 1 PCP Taiwo Padilla MD     Reason for Consultation:  SOB Facility-Administered Medications:   •  0.9%  NaCl infusion, , Intravenous, Once  •  acetaminophen (TYLENOL) tab 650 mg, 650 mg, Oral, Q4H PRN  •  ALPRAZolam (XANAX) tab 0.25 mg, 0.25 mg, Oral, BID PRN  •  amiodarone HCl (PACERONE) tab 200 mg, 200 mg, Oral apparent distress. HEENT: No focal deficits. Neck: carotids 2+   Cardiac: Regular rate and rhythm  Lungs: Bibasilar rales  Abdomen: Soft, non-tender. Extremities: Without clubbing, cyanosis or edema  Neurologic: Alert and oriented, normal affect.   Skin

## 2018-03-02 NOTE — PROGRESS NOTES
[facility]  Progress Note    Lazaro Ray Patient Status:  Inpatient    1940 MRN [id number]   [facility] 2[facility]A Attending Margarita Quiroz MD   Hosp Day # 1 PCP Jerzy Dee MD     CC: Shortness of breath    SUBJECTIVE:  Patie HGB 6.7 03/02/2018   HCT 22.4 03/02/2018   .0 03/02/2018   CREATSERUM 1.56 03/02/2018   BUN 32 03/02/2018    03/02/2018   K 3.1 03/02/2018    03/02/2018   CO2 28.0 03/02/2018   GLU 92 03/02/2018   CA 8.0 03/02/2018   B12 644 03/02/2018 Oral Daily with breakfast   gabapentin (NEURONTIN) tab 600 mg 600 mg Oral BID   HYDROcodone-acetaminophen (NORCO) 5-325 MG per tab 1 tablet 1 tablet Oral Q4H PRN   multivitamin (ADULT) tab 1 tablet 1 tablet Oral Daily   Pantoprazole Sodium (PROTONIX) EC ta and Plan of care were discussed with patient and RN            Parisa Jackson MD  3/2/2018  12:50 PM    Addentum:  D/w RN . Rpt Hb after transfusion 8.4.  Rpt BP while on bolus 96/69 mm Hg  Parisa Jackson MD

## 2018-03-02 NOTE — PLAN OF CARE
Problem: CARDIOVASCULAR - ADULT  Goal: Maintains optimal cardiac output and hemodynamic stability  INTERVENTIONS:  - Monitor vital signs, rhythm, and trends  - Monitor for bleeding, hypotension and signs of decreased cardiac output  - Evaluate effectivenes ANY PAIN. INCONTINENT OF URINE AND KEPT CLEAN AND DRY AT ALL TIMES. ALL NEEDS ATTENDED AND WILL CONTINUE TO MONITOR. CALL LIGHT WITHIN REACH.

## 2018-03-02 NOTE — PROGRESS NOTES
[facility] Pulmonary/Critical Care Progress Note     SUBJECTIVE/24H Events: All events, procedures, notes reviewed. She reports feeling better today but cannot say what helped.  RN notes overnight o2 increased to 15L fro GFRAA  36*  36*   GFRNAA  32*  31*   CA  8.6  8.0*   NA  141  144   K  4.0  3.1*   CL  109  106   CO2  23.0  28.0     Recent Labs   Lab  03/01/18   1039  03/02/18   0551   RBC  3.00*  2.72*   HGB  7.3*  6.7*   HCT  24.7*  22.4*   MCV  82.3  82.4   MCH  2 (cpt=71045)    Result Date: 3/1/2018  CONCLUSION:  Dense airspace opacities of the lungs are best seen within the bilateral upper lobes and right lower lobe, suggestive of multifocal pneumonia.   This could be better characterized with CT if clinically josh negative; cultures negative thus far  · Continue bronchodilators and pulmonary toileting  · Wean o2 for sats >89%; may consider simple mask rather than nasal cannula given her tendency to respire via oropharynx than nose  · Will obtain CT noncontrast chest

## 2018-03-03 ENCOUNTER — APPOINTMENT (OUTPATIENT)
Dept: ULTRASOUND IMAGING | Facility: HOSPITAL | Age: 78
DRG: 177 | End: 2018-03-03
Attending: INTERNAL MEDICINE
Payer: MEDICARE

## 2018-03-03 ENCOUNTER — APPOINTMENT (OUTPATIENT)
Dept: GENERAL RADIOLOGY | Facility: HOSPITAL | Age: 78
DRG: 177 | End: 2018-03-03
Attending: INTERNAL MEDICINE
Payer: MEDICARE

## 2018-03-03 ENCOUNTER — APPOINTMENT (OUTPATIENT)
Dept: CT IMAGING | Facility: HOSPITAL | Age: 78
DRG: 177 | End: 2018-03-03
Attending: INTERNAL MEDICINE
Payer: MEDICARE

## 2018-03-03 LAB
ATRIAL RATE: 80 BPM
BLOOD TYPE BARCODE: 5100
P AXIS: 41 DEGREES
P-R INTERVAL: 164 MS
Q-T INTERVAL: 412 MS
QRS DURATION: 136 MS
QTC CALCULATION (BEZET): 475 MS
R AXIS: -53 DEGREES
T AXIS: 73 DEGREES
VENTRICULAR RATE: 80 BPM

## 2018-03-03 PROCEDURE — 99223 1ST HOSP IP/OBS HIGH 75: CPT | Performed by: INTERNAL MEDICINE

## 2018-03-03 PROCEDURE — 71045 X-RAY EXAM CHEST 1 VIEW: CPT | Performed by: INTERNAL MEDICINE

## 2018-03-03 PROCEDURE — 76700 US EXAM ABDOM COMPLETE: CPT | Performed by: INTERNAL MEDICINE

## 2018-03-03 PROCEDURE — 71250 CT THORAX DX C-: CPT | Performed by: INTERNAL MEDICINE

## 2018-03-03 PROCEDURE — 99232 SBSQ HOSP IP/OBS MODERATE 35: CPT | Performed by: INTERNAL MEDICINE

## 2018-03-03 PROCEDURE — 99222 1ST HOSP IP/OBS MODERATE 55: CPT | Performed by: INTERNAL MEDICINE

## 2018-03-03 RX ORDER — FOLIC ACID 1 MG/1
1 TABLET ORAL DAILY
Status: DISCONTINUED | OUTPATIENT
Start: 2018-03-03 | End: 2018-03-09

## 2018-03-03 RX ORDER — FUROSEMIDE 10 MG/ML
20 INJECTION INTRAMUSCULAR; INTRAVENOUS ONCE
Status: COMPLETED | OUTPATIENT
Start: 2018-03-03 | End: 2018-03-03

## 2018-03-03 RX ORDER — METHYLPREDNISOLONE SODIUM SUCCINATE 40 MG/ML
40 INJECTION, POWDER, LYOPHILIZED, FOR SOLUTION INTRAMUSCULAR; INTRAVENOUS EVERY 8 HOURS
Status: DISCONTINUED | OUTPATIENT
Start: 2018-03-03 | End: 2018-03-08

## 2018-03-03 NOTE — PLAN OF CARE
Problem: CARDIOVASCULAR - ADULT  Goal: Maintains optimal cardiac output and hemodynamic stability  INTERVENTIONS:  - Monitor vital signs, rhythm, and trends  - Monitor for bleeding, hypotension and signs of decreased cardiac output  - Evaluate effectivenes UNDERSTANDING. ALL NEEDS ATTENDED AND WILL CONTINUE TO MONITOR. CALL LIGHT WITHIN REACH. FOR CT OF CHEST.  PATIENT REFUSED TO GO DOWN TONIGHT AND STATED PREFER IN AM.

## 2018-03-03 NOTE — PLAN OF CARE
Patient systolic 73 and 80  after 1 unit transfusion this eveining. Call placed to hospitalist. Patient pressure was 80's prior to transfusion. Patient has history of anemia. Stat cbc revealed hgb 8.4 improved after blood and still hypotensive.  Iv bolus sa

## 2018-03-03 NOTE — PROGRESS NOTES
[facility] Cardiology Progress Note    Subjective:  Still quite sob, did not sleep well.      Objective:  /80 (BP Location: Left arm)   Pulse 84   Temp 98.8 °F (37.1 °C) (Oral)   Resp 18   Ht 152.4 cm (5')   Wt 113 lb 5.1 oz (51.4 kg)   SpO2 91%   BMI 22

## 2018-03-03 NOTE — PLAN OF CARE
Call placed to hospitalist Dr Ba May to update pt's clinical status. Sbp 110 and hr 70-80's. No call as of yet from GI. Dr Ba May stressed the need to call in one hr if still no return call. Will pass this onto the night shift.

## 2018-03-03 NOTE — CONSULTS
[facility]  Report of Consultation    Nav Oviedo Patient Status:  Inpatient    1940 MRN [id number]   [facility] 2NE-A Attending Stanley Fleischer, MD   Hosp Day # 2 PCP Gabrielle Gomez MD     Reason for Consultation:  CKD 3/PKD Medications:    Current Facility-Administered Medications:   •  iron sucrose (VENOFER) IV Push 200 mg, 200 mg, Intravenous, Daily  •  folic acid (FOLVITE) tab 1 mg, 1 mg, Oral, Daily  •  0.9%  NaCl infusion, , Intravenous, Once  •  ipratropium-albutero °C), Min:98.1 °F (36.7 °C), Max:98.8 °F (37.1 °C)       Intake/Output Summary (Last 24 hours) at 03/03/18 1438  Last data filed at 03/03/18 1254   Gross per 24 hour   Intake              870 ml   Output              200 ml   Net              670 ml     Las 1.56*   --    CA  8.6  8.0*   --    GLU  107*  92   --        Recent Labs   Lab  03/01/18   1039  03/02/18   0551   ALT  9*  10*   AST  28  25   ALB  2.3*  2.0*   LDH   --   454*       No results for input(s): PGLU in the last 72 hours.         Imaging:  CT

## 2018-03-03 NOTE — CONSULTS
[facility]    Report of Consultation    Mode Keys Patient Status:  Inpatient    1940 MRN [id number]   [facility] 2NE-A Attending Babatunde Parr MD   [facility] Day # 2 PCP Ami Galdamez MD     Date of Admission:  3/1/2018  Da kidney disease    • PVD (peripheral vascular disease) (HCC)    • Shortness of breath      Past Surgical History:  No date: CABG  No date: CATARACT  Family History   Problem Relation Age of Onset   • Heart Disorder Father    • Heart Disorder Mother    • Can Q24H  •  Piperacillin Sod-Tazobactam So (ZOSYN) 3.375 g in dextrose 5 % 100 mL ADD-vantage, 3.375 g, Intravenous, Q8H  •  Prochlorperazine Edisylate (COMPAZINE) injection 10 mg, 10 mg, Intravenous, Q6H PRN    Review of Systems:  A 10-point review of system right middle lobe now measuring 3.7 x 4.2 cm as compared to 2.6 x 2.3 cm on the previous exam.  The findings are concerning for a progressive primary pulmonary malignancy. Clinical correlation recommended. PET-CT may be of further value.      3.  Sally Pradhan Folate with oral replacement, as well. Lung mass: The patient has had a lung nodule that has been increasing in size. She has declined workup and a referral to radiation oncology in the past for this issue.  I had a very rickey discussion with her relati

## 2018-03-03 NOTE — CONSULTS
[facility]                       Gastroenterology [address] [facility] Gastroenterology    Moreno Baumann Patient Status:  Inpatient    1940 MRN [id number]   [facility] 2NE-A Attending Darrell Morales MD   [facility] Da 0.9%  NaCl infusion  Intravenous Once   [COMPLETED] Potassium Chloride ER (K-DUR M20) CR tab 40 mEq 40 mEq Oral Q4H   ipratropium-albuterol (DUONEB) nebulizer solution 3 mL 3 mL Nebulization QID   [COMPLETED] sodium chloride 0.9% IV bolus 500 mL 500 mL I no family history of colon cancer or other gastrointestinal malignancies; No family history of ulcer disease, or inflammatory bowel disease  ROS:  In addition to the pertinent positives described above:             Infectious Disease:  No chronic infection dry  Psychiatric: Appropriate mood and congruent affect without obvious depression or anxiety  Labs:   Lab Results  Component Value Date   WBC 15.1 03/02/2018   HGB 8.4 03/02/2018   HCT 27.5 03/02/2018   .0 03/02/2018   K 4.7 03/02/2018     Recent L polycystic kidney disease. Please see above for further details.      Dictated by: Rivas Lacey MD on 3/03/2018 at 8:14       Approved by: Rivas Lacey MD              Impression:   3. 76 y/o female with a h/o known lung mass [facility]

## 2018-03-04 LAB
ATRIAL RATE: 85 BPM
BASOPHILS # BLD AUTO: 0.01 X10(3) UL (ref 0–0.1)
BASOPHILS NFR BLD AUTO: 0.1 %
BUN BLD-MCNC: 29 MG/DL (ref 8–20)
CALCIUM BLD-MCNC: 7.8 MG/DL (ref 8.3–10.3)
CHLORIDE: 106 MMOL/L (ref 101–111)
CO2: 27 MMOL/L (ref 22–32)
CREAT BLD-MCNC: 1.47 MG/DL (ref 0.55–1.02)
EOSINOPHIL # BLD AUTO: 0 X10(3) UL (ref 0–0.3)
EOSINOPHIL NFR BLD AUTO: 0 %
ERYTHROCYTE [DISTWIDTH] IN BLOOD BY AUTOMATED COUNT: 17.2 % (ref 11.5–16)
ERYTHROPOIETIN (EPO): 108 MU/ML
GLUCOSE BLD-MCNC: 184 MG/DL (ref 70–99)
HCT VFR BLD AUTO: 27.5 % (ref 34–50)
HGB BLD-MCNC: 8.4 G/DL (ref 12–16)
IMMATURE GRANULOCYTE COUNT: 0.09 X10(3) UL (ref 0–1)
IMMATURE GRANULOCYTE RATIO %: 0.8 %
LYMPHOCYTES # BLD AUTO: 0.57 X10(3) UL (ref 0.9–4)
LYMPHOCYTES NFR BLD AUTO: 5.1 %
MCH RBC QN AUTO: 25.7 PG (ref 27–33.2)
MCHC RBC AUTO-ENTMCNC: 30.5 G/DL (ref 31–37)
MCV RBC AUTO: 84.1 FL (ref 81–100)
MONOCYTES # BLD AUTO: 0.26 X10(3) UL (ref 0.1–1)
MONOCYTES NFR BLD AUTO: 2.3 %
NEUTROPHIL ABS PRELIM: 10.27 X10 (3) UL (ref 1.3–6.7)
NEUTROPHILS # BLD AUTO: 10.27 X10(3) UL (ref 1.3–6.7)
NEUTROPHILS NFR BLD AUTO: 91.7 %
P AXIS: 29 DEGREES
P-R INTERVAL: 168 MS
PLATELET # BLD AUTO: 297 10(3)UL (ref 150–450)
POTASSIUM SERPL-SCNC: 4 MMOL/L (ref 3.6–5.1)
Q-T INTERVAL: 420 MS
QRS DURATION: 138 MS
QTC CALCULATION (BEZET): 499 MS
R AXIS: -52 DEGREES
RBC # BLD AUTO: 3.27 X10(6)UL (ref 3.8–5.1)
RED CELL DISTRIBUTION WIDTH-SD: 52.9 FL (ref 35.1–46.3)
SODIUM SERPL-SCNC: 141 MMOL/L (ref 136–144)
T AXIS: 48 DEGREES
VENTRICULAR RATE: 85 BPM
WBC # BLD AUTO: 11.2 X10(3) UL (ref 4–13)

## 2018-03-04 PROCEDURE — 99232 SBSQ HOSP IP/OBS MODERATE 35: CPT | Performed by: INTERNAL MEDICINE

## 2018-03-04 RX ORDER — FUROSEMIDE 10 MG/ML
40 INJECTION INTRAMUSCULAR; INTRAVENOUS
Status: DISPENSED | OUTPATIENT
Start: 2018-03-04 | End: 2018-03-06

## 2018-03-04 NOTE — PROGRESS NOTES
[facility]  Progress Note    Martin Workman Patient Status:  Inpatient    1940 MRN [id number]   [facility] 2NE-A Attending Pj Bennett MD   Hosp Day # 2 PCP Omar Coon MD     CC: Shortness of breath    SUBJECTIVE:   Lauren Pascual radiograph was obtained. COMPARISON:  [facility] , XR CHEST AP PORTABLE  (CPT=71045), 3/01/2018, 11:14. INDICATIONS:  hypoxia     PATIENT STATED HISTORY: (As transcribed by Technologist)  Patient offered no additional history at this time.           FIN tablet Oral Daily   Pantoprazole Sodium (PROTONIX) EC tab 20 mg 20 mg Oral QAM AC   ipratropium-albuterol (DUONEB) nebulizer solution 3 mL 3 mL Nebulization Q4H PRN   Piperacillin Sod-Tazobactam So (ZOSYN) 3.375 g in dextrose 5 % 100 mL ADD-vantage 3.375 g

## 2018-03-04 NOTE — RESPIRATORY THERAPY NOTE
Pt could not keep her SpO2 to 90% while receiving 15L O2 via High flow nasal canula. Placed pt on Vapotherm 40L 80%. Pt SpO2 improved. Will continue to monitor and wean as tolerated.

## 2018-03-04 NOTE — PROGRESS NOTES
[facility] Cardiology Progress Note    Subjective:  Breathing is more comfortable.      Objective:  /77 (BP Location: Left arm)   Pulse 72   Temp 98.6 °F (37 °C) (Oral)   Resp 18   Ht 152.4 cm (5')   Wt 112 lb 10.5 oz (51.1 kg)   SpO2 93%   BMI 22.00 kg/

## 2018-03-04 NOTE — PROGRESS NOTES
[facility]  Progress Note    Deepika Rivas Patient Status:  Inpatient    1940 MRN [id number]   [facility] 2NE-A Attending Stephen Chaudhry MD   Hosp Day # 3 PCP Taiwo Padilla MD     Subjective:  Deepika Rivas is a(n) 77 year acute cholecystitis evidence of chronic liver disease  Chest x-rays and CAT scans reviewed      Medications reviewed     Assessment and Plan:   Patient Active Problem List:     Cellulitis of right lower extremity     Toe gangrene (HCC)     Benign essential Miriam Avelar MD  3/4/2018  5:20 PM

## 2018-03-04 NOTE — PROGRESS NOTES
[facility]    Progress Note    Mode Keys Patient Status:  Inpatient    1940 MRN [id number]   [facility] 2NE-A Attending Babatunde Parr MD   Hosp Day # 3 PCP Ami Galdamez MD     Subjective:  Mode Keys is a(n) 77 y lower extremity due to atherosclerosis [facility])     Coronary artery disease without angina pectoris     Dyslipidemia     Anemia in chronic kidney disease     Atherosclerosis of arteries of extremities (HCC)     Fungal infection of the groin     Acute on chron

## 2018-03-04 NOTE — PROGRESS NOTES
[facility]  Progress Note    Luis E Valverde Patient Status:  Inpatient    1940 MRN [id number]   [facility] 2NE-A Attending Nikolay Borden MD   Hosp Day # 3 PCP Shayy Sol MD     CC: Shortness of breath    SUBJECTIVE:   Hetal Yeung 03/04/2018   BUN 29 03/04/2018    03/04/2018   K 4.0 03/04/2018    03/04/2018   CO2 27.0 03/04/2018    03/04/2018   CA 7.8 03/04/2018       Imaging:   XR CHEST AP PORTABLE  (CPT=71045)     TECHNIQUE:  AP chest radiograph was obtained. cholestyramine light (PREVALITE) powder packet 4 g 4 g Oral BID   ferrous sulfate EC tab 325 mg 325 mg Oral Daily with breakfast   gabapentin (NEURONTIN) tab 600 mg 600 mg Oral BID   HYDROcodone-acetaminophen (NORCO) 5-325 MG per tab 1 tablet 1 tablet Or discharge: To be decided  Discharge is dependent on: Clinical progress  At this point Ms. Jose Garcia  is expected to be discharge to:  To be decided      Questions/concerns and Plan of care were discussed with patient and RN            Chery Richardson MD  3/4/20

## 2018-03-04 NOTE — PROGRESS NOTES
[facility]  Nephrology Progress Note    Sesar Gini Patient Status:  Inpatient    1940 MRN [id number]   [facility] 2NE-A Attending Ramon Murray MD   Hosp Day # 3 PCP Vijaya Elias MD       SUBJECTIVE:  Notes reviewed, rem 8.0*   --   7.8*   GLU  107*  92   --   184*       Recent Labs   Lab  03/01/18   1039  03/02/18   0551   ALT  9*  10*   AST  28  25   ALB  2.3*  2.0*   LDH   --   454*       No results for input(s): PGLU in the last 72 hours.     Meds:     Current Facility for next 2 days     #2. Hypoxia- multifactorial with pneumonia as well as enlarging mass and presumed carcinomatosis. Appears stable from volume standpoint. IV lasix as outlined. pulm following     #3.   R lung mass- enlarged when compared to previous an

## 2018-03-04 NOTE — PLAN OF CARE
Problem: CARDIOVASCULAR - ADULT  Goal: Maintains optimal cardiac output and hemodynamic stability  INTERVENTIONS:  - Monitor vital signs, rhythm, and trends  - Monitor for bleeding, hypotension and signs of decreased cardiac output  - Evaluate effectivenes WILL CONTINUE TO MONITOR. CALL LIGHT WITHIN REACH.

## 2018-03-05 ENCOUNTER — APPOINTMENT (OUTPATIENT)
Dept: GENERAL RADIOLOGY | Facility: HOSPITAL | Age: 78
DRG: 177 | End: 2018-03-05
Attending: INTERNAL MEDICINE
Payer: MEDICARE

## 2018-03-05 PROBLEM — Z51.5 PALLIATIVE CARE ENCOUNTER: Status: ACTIVE | Noted: 2018-03-05

## 2018-03-05 PROBLEM — Z71.89 GOALS OF CARE, COUNSELING/DISCUSSION: Status: ACTIVE | Noted: 2018-03-05

## 2018-03-05 LAB
ATRIAL RATE: 74 BPM
BASOPHILS # BLD AUTO: 0.01 X10(3) UL (ref 0–0.1)
BASOPHILS NFR BLD AUTO: 0.1 %
BUN BLD-MCNC: 38 MG/DL (ref 8–20)
CALCIUM BLD-MCNC: 8.1 MG/DL (ref 8.3–10.3)
CHLORIDE: 106 MMOL/L (ref 101–111)
CO2: 29 MMOL/L (ref 22–32)
CREAT BLD-MCNC: 1.62 MG/DL (ref 0.55–1.02)
EOSINOPHIL # BLD AUTO: 0 X10(3) UL (ref 0–0.3)
EOSINOPHIL NFR BLD AUTO: 0 %
ERYTHROCYTE [DISTWIDTH] IN BLOOD BY AUTOMATED COUNT: 17.5 % (ref 11.5–16)
GLUCOSE BLD-MCNC: 151 MG/DL (ref 70–99)
HCT VFR BLD AUTO: 26.9 % (ref 34–50)
HGB BLD-MCNC: 8.2 G/DL (ref 12–16)
IMMATURE GRANULOCYTE COUNT: 0.23 X10(3) UL (ref 0–1)
IMMATURE GRANULOCYTE RATIO %: 1.2 %
LYMPHOCYTES # BLD AUTO: 0.72 X10(3) UL (ref 0.9–4)
LYMPHOCYTES NFR BLD AUTO: 3.8 %
MCH RBC QN AUTO: 25.4 PG (ref 27–33.2)
MCHC RBC AUTO-ENTMCNC: 30.5 G/DL (ref 31–37)
MCV RBC AUTO: 83.3 FL (ref 81–100)
MONOCYTES # BLD AUTO: 0.95 X10(3) UL (ref 0.1–1)
MONOCYTES NFR BLD AUTO: 5.1 %
NEUTROPHIL ABS PRELIM: 16.9 X10 (3) UL (ref 1.3–6.7)
NEUTROPHILS # BLD AUTO: 16.9 X10(3) UL (ref 1.3–6.7)
NEUTROPHILS NFR BLD AUTO: 89.8 %
P AXIS: 38 DEGREES
P-R INTERVAL: 168 MS
PLATELET # BLD AUTO: 339 10(3)UL (ref 150–450)
POTASSIUM SERPL-SCNC: 3.4 MMOL/L (ref 3.6–5.1)
POTASSIUM SERPL-SCNC: 4.4 MMOL/L (ref 3.6–5.1)
Q-T INTERVAL: 432 MS
QRS DURATION: 138 MS
QTC CALCULATION (BEZET): 479 MS
R AXIS: -59 DEGREES
RBC # BLD AUTO: 3.23 X10(6)UL (ref 3.8–5.1)
RED CELL DISTRIBUTION WIDTH-SD: 53.1 FL (ref 35.1–46.3)
SODIUM SERPL-SCNC: 143 MMOL/L (ref 136–144)
T AXIS: 25 DEGREES
VENTRICULAR RATE: 74 BPM
WBC # BLD AUTO: 18.8 X10(3) UL (ref 4–13)

## 2018-03-05 PROCEDURE — 71045 X-RAY EXAM CHEST 1 VIEW: CPT | Performed by: INTERNAL MEDICINE

## 2018-03-05 PROCEDURE — 99232 SBSQ HOSP IP/OBS MODERATE 35: CPT | Performed by: INTERNAL MEDICINE

## 2018-03-05 RX ORDER — POTASSIUM CHLORIDE 20 MEQ/1
40 TABLET, EXTENDED RELEASE ORAL EVERY 4 HOURS
Status: COMPLETED | OUTPATIENT
Start: 2018-03-05 | End: 2018-03-05

## 2018-03-05 NOTE — CONSULTS
[facility]  [id number]  Hospital Day #4  Date of Consult: 03/05/18       Reason for Consultation: Consult requested for evaluation of palliative care needs and goals of care discussion. x3  Respiratory:  Even and unlabored at rest; O2  Extremities:  Warm  Palliative Performance Scale : 50%    Assessment/Recommendations:  Prognosis/Goals of Care: Briefly spoke with patient. Patient is overwhelmed. Spoke with patient's son.   Patient's son

## 2018-03-05 NOTE — PALLIATIVE CARE NOTE
PC consult received. PC RMN/Scarlett advised PCSW that a goals of care discussion is scheduled for 3/5 at 8am; PCSW will f/u based on outcome of goals of care discussion. Unit JASON/Meena notified via page.

## 2018-03-05 NOTE — CM/SW NOTE
[facility] that there will be no d/c today. PC to see patient on Tuesday.

## 2018-03-05 NOTE — PROGRESS NOTES
DR. BRADEN PAGED TO NOTIFY PT'S BP 66/46, HR 81; NOTIFIED PT ASYMPTOMATIC AND RECEIVED 250CC NS BOLUS EARLIER TODAY (ORDERED PER RENAL). ORDER RECEIVED TO CONTINUE TO MONITOR AND NOTIFY CARDIOLOGY ONLY IF PT SYMPTOMATIC.   2307  PT IN BED, BP 79/57, HR 77.  DE

## 2018-03-05 NOTE — PROGRESS NOTES
A&ox4, still asymptomatic with improving BP - now 85/65  Vapotherm, 97% sat, , zosyn & solu-medrol, nebs QID  NSR, monitoring BP, no edema  Incontinent of bladder, bed bound at baseline  Palliative care to see in am, morning CXR & EKG  Will continue to

## 2018-03-05 NOTE — PROGRESS NOTES
[facility]  Progress Note    Pedro Diop Patient Status:  Inpatient    1940 MRN [id number]   [facility] 2NE-A Attending Che Lindsay MD   Hosp Day # 4 PCP Malia Rodrigues MD     ASSESSMENT  1.  Hypoxia - due to heatlhcare failure (HCC)     Acute on chronic congestive heart failure, unspecified heart failure type (HCC)     Hypoxia     Elevated troponin     Renal insufficiency     Nosocomial pneumonia     Anemia     Anemia in stage 3 chronic kidney disease     Iron deficiency 8.1*   NA   --   141  143   K  4.7  4.0  3.4*   CL   --   106  106   CO2   --   27.0  29.0     @MG@      Lab Results  Component Value Date   INR 1.32 (H) 03/01/2018        No results for input(s): TSH in the last 72 hours.     No results for input(s): ABGP data found for         Eduardo Dominguez  3/5/2018  12:20 PM

## 2018-03-05 NOTE — PROGRESS NOTES
[facility]  Cardiology Progress Note    Luis E Valverde Patient Status:  Inpatient    1940 MRN [id number]   [facility] 2NE-A Attending Nikolay Borden MD   Hosp Day # 4 PCP Shayy Sol MD     Subjective:  States that her breat • atorvastatin  10 mg Oral Nightly   • Calcium Carbonate-Vitamin D  2 tablet Oral BID   • cholestyramine light  4 g Oral BID   • ferrous sulfate  325 mg Oral Daily with breakfast   • gabapentin  600 mg Oral BID   • multivitamin  1 tablet Oral Daily   • P

## 2018-03-05 NOTE — PROGRESS NOTES
[facility]    Progress Note    Jose G Villa Patient Status:  Inpatient    1940 MRN [id number]   [facility] 2NE-A Attending Sergey Acevedo MD   Hosp Day # 4 PCP Sharon Rivera MD     Subjective:  Jose G Villa is a(n) 77 y Atherosclerosis of native artery of right lower extremity with gangrene ([facility] [address]     Gangrene of right lower extremity due to atherosclerosis [facility])     Coronary artery disease without angina pectoris     Dyslipidemia     Anemia in chronic kidney disease     A

## 2018-03-05 NOTE — PROGRESS NOTES
[facility]  Nephrology Progress Note    Lonn Record Patient Status:  Inpatient    1940 MRN [id number]   [facility] 2NE-A Attending Aureliano Avila MD   Hosp Day # 4 PCP Sandy Neville MD       SUBJECTIVE:  No acute events, re AMYLASE, LIPASE, LDH in the last 72 hours. Invalid input(s): ALPHOS, TBIL, DBIL, TPROT    No results for input(s): PGLU in the last 72 hours.     Meds:     Current Facility-Administered Medications:  Potassium Chloride ER (K-DUR M20) CR tab 40 mEq 40 mEq Hypoxia- multifactorial with pneumonia as well as enlarging mass and presumed carcinomatosis. Appears stable from volume standpoint. pulm following     #3. R lung mass- enlarged when compared to previous and suspicious for primary lung cancer.  No furth

## 2018-03-06 ENCOUNTER — APPOINTMENT (OUTPATIENT)
Dept: GENERAL RADIOLOGY | Facility: HOSPITAL | Age: 78
DRG: 177 | End: 2018-03-06
Attending: INTERNAL MEDICINE
Payer: MEDICARE

## 2018-03-06 LAB
BASOPHILS # BLD AUTO: 0.01 X10(3) UL (ref 0–0.1)
BASOPHILS NFR BLD AUTO: 0.1 %
BUN BLD-MCNC: 44 MG/DL (ref 8–20)
CALCIUM BLD-MCNC: 8 MG/DL (ref 8.3–10.3)
CHLORIDE: 106 MMOL/L (ref 101–111)
CO2: 27 MMOL/L (ref 22–32)
CREAT BLD-MCNC: 1.8 MG/DL (ref 0.55–1.02)
EOSINOPHIL # BLD AUTO: 0 X10(3) UL (ref 0–0.3)
EOSINOPHIL NFR BLD AUTO: 0 %
ERYTHROCYTE [DISTWIDTH] IN BLOOD BY AUTOMATED COUNT: 18.1 % (ref 11.5–16)
GLUCOSE BLD-MCNC: 150 MG/DL (ref 70–99)
HCT VFR BLD AUTO: 27.8 % (ref 34–50)
HGB BLD-MCNC: 8.3 G/DL (ref 12–16)
IMMATURE GRANULOCYTE COUNT: 0.46 X10(3) UL (ref 0–1)
IMMATURE GRANULOCYTE RATIO %: 2.4 %
LYMPHOCYTES # BLD AUTO: 0.71 X10(3) UL (ref 0.9–4)
LYMPHOCYTES NFR BLD AUTO: 3.7 %
MCH RBC QN AUTO: 25.2 PG (ref 27–33.2)
MCHC RBC AUTO-ENTMCNC: 29.9 G/DL (ref 31–37)
MCV RBC AUTO: 84.2 FL (ref 81–100)
MONOCYTES # BLD AUTO: 0.58 X10(3) UL (ref 0.1–1)
MONOCYTES NFR BLD AUTO: 3 %
NEUTROPHIL ABS PRELIM: 17.65 X10 (3) UL (ref 1.3–6.7)
NEUTROPHILS # BLD AUTO: 17.65 X10(3) UL (ref 1.3–6.7)
NEUTROPHILS NFR BLD AUTO: 90.8 %
PLATELET # BLD AUTO: 341 10(3)UL (ref 150–450)
POTASSIUM SERPL-SCNC: 4.4 MMOL/L (ref 3.6–5.1)
PROCALCITONIN SERPL-MCNC: 0.27 NG/ML (ref ?–0.11)
RBC # BLD AUTO: 3.3 X10(6)UL (ref 3.8–5.1)
RED CELL DISTRIBUTION WIDTH-SD: 54.6 FL (ref 35.1–46.3)
SODIUM SERPL-SCNC: 141 MMOL/L (ref 136–144)
WBC # BLD AUTO: 19.4 X10(3) UL (ref 4–13)

## 2018-03-06 PROCEDURE — 99232 SBSQ HOSP IP/OBS MODERATE 35: CPT | Performed by: INTERNAL MEDICINE

## 2018-03-06 PROCEDURE — 71045 X-RAY EXAM CHEST 1 VIEW: CPT | Performed by: INTERNAL MEDICINE

## 2018-03-06 PROCEDURE — 99356 PROLONGED SERV,INPATIENT,1ST HR: CPT | Performed by: CLINICAL NURSE SPECIALIST

## 2018-03-06 PROCEDURE — 99233 SBSQ HOSP IP/OBS HIGH 50: CPT | Performed by: CLINICAL NURSE SPECIALIST

## 2018-03-06 NOTE — PROGRESS NOTES
[facility]  Progress Note    Beverly Almanza Patient Status:  Inpatient    1940 MRN [id number]   [facility] 2NE-A Attending Tasia Driscoll MD   Hosp Day # 5 PCP Sharon Duncan MD     CC: Shortness of breath    SUBJECTIVE: Cynthiaen 03/06/2018   HGB 8.3 03/06/2018   HCT 27.8 03/06/2018   .0 03/06/2018   CREATSERUM 1.80 03/06/2018   BUN 44 03/06/2018    03/06/2018   K 4.4 03/06/2018    03/06/2018   CO2 27.0 03/06/2018    03/06/2018   CA 8.0 03/06/2018       Im (OYSTER-D) 250-125 MG-UNIT per tab 2 tablet 2 tablet Oral BID   cholestyramine light (PREVALITE) powder packet 4 g 4 g Oral BID   ferrous sulfate EC tab 325 mg 325 mg Oral Daily with breakfast   gabapentin (NEURONTIN) tab 600 mg 600 mg Oral BID   HYDROcodo difficile          Quality:  · DVT Prophylaxis: SCD  · CODE status: DNR  · Hussein: no  · Central line: no    Estimated date of discharge:  To be decided  Discharge is dependent on: Clinical progress, off Vapotherm high flow oxygen, pulmonary clearance  At th

## 2018-03-06 NOTE — PALLIATIVE CARE NOTE
PCSW joined goals of care discussion this morning with pt, pts son, pts daugther-in-law and PC BRITTNEY/Scarlett. PCSW introduced role of PCSW; pt and family verbalized understanding to this.     Please see PC BRITTNEY/Scarlett's notes for details on goals of care disc

## 2018-03-06 NOTE — PROGRESS NOTES
[facility]  Nephrology Progress Note    Martin Workman Patient Status:  Inpatient    1940 MRN [id number]   [facility] 2NE-A Attending Pj Bennett MD   Hosp Day # 5 PCP Omar Coon MD       SUBJECTIVE:  No acute events, re CREATSERUM  1.47*  1.62*   --   1.80*   CA  7.8*  8.1*   --   8.0*   GLU  184*  151*   --   150*       No results for input(s): ALT, AST, ALB, AMYLASE, LIPASE, LDH in the last 72 hours.     Invalid input(s): ALPHOS, TBIL, DBIL, TPROT    No results for inp pneumonia as well as enlarging mass and presumed carcinomatosis. Appears stable from volume standpoint although difficult to . Was significantly hypotensive over the weekend and IV diuretics are on hold. pulm following     #3.   R lung mass- enlarge

## 2018-03-06 NOTE — PLAN OF CARE
Pt a/ox4. Vapotherm. 25LPM / 40% FiO2. Lungs with slight crackles to bilateral bases posteriorly. NSR with BBB. BM this morning. Voids. Pt encouraged to pivot to chair today. BP WNL. Palliative care meeting with family this morning. Denies pain.  Call light

## 2018-03-06 NOTE — PROGRESS NOTES
[facility]  Progress Note    Alyssia Arm Patient Status:  Inpatient    1940 MRN [id number]   [facility] 2NE-A Attending Valentino Marquis MD   Hosp Day # 5 PCP Shilpi Green MD     ASSESSMENT  1.  Hypoxia - due to heatlhcare congestive heart failure (HCC)     Acute on chronic congestive heart failure, unspecified heart failure type (HCC)     Hypoxia     Elevated troponin     Renal insufficiency     Nosocomial pneumonia     Anemia     Anemia in stage 3 chronic kidney disease 35*   --   30*   GFRNAA  34*  30*   --   26*   CA  7.8*  8.1*   --   8.0*   NA  141  143   --   141   K  4.0  3.4*  4.4  4.4   CL  106  106   --   106   CO2  27.0  29.0   --   27.0     @MG@      Lab Results  Component Value Date   INR 1.32 (H) 03/01/2018 Angelica  3/6/2018  3:17 PM

## 2018-03-06 NOTE — CDS QUERY
Pneumonia Specificity  CLINICAL DOCUMENTATION CLARIFICATION FORM    Dear Doctor:  Clinical information (provided below) indicates pneumonia.  For accurate ICD-10-CM code assignment to reflect severity of illness and risk of mortality,   PLEASE (X) ALL Champion Hodgkins

## 2018-03-06 NOTE — PALLIATIVE CARE NOTE
1809 Reagan Alfonso Follow Up      Adrianna Cope  [id number]       Patient seen and evaluated, family (son and daughter-in-law) at bedside.      ROS: denies complaints    Physical Exam:  GEN:  NAD  Neuro:  Awake and alert, oriented x3 discussions    A total of 65 minutes were spent on this visit; >50% was spent counseling and coordinating care. We will continue to follow.     LILLIANA Li  Palliative Care Nurse Practitioner  Pager [phone], Phone [phone]  3/6/2018  9:05 AM

## 2018-03-06 NOTE — PROGRESS NOTES
[facility] Pharmacy Note:  Renal Adjustment for piperacillin/tazobactam (Morgan Griffith)    Rosie Alcocer is a 77year old female who has been prescribed piperacillin/tazobactam (ZOSYN) 3.375 gm every 8 hrs.   CrCl is estimated creatinine clearance is 18.2 mL/min (based

## 2018-03-07 PROCEDURE — 99232 SBSQ HOSP IP/OBS MODERATE 35: CPT | Performed by: HOSPITALIST

## 2018-03-07 PROCEDURE — 99232 SBSQ HOSP IP/OBS MODERATE 35: CPT | Performed by: INTERNAL MEDICINE

## 2018-03-07 PROCEDURE — 99233 SBSQ HOSP IP/OBS HIGH 50: CPT | Performed by: CLINICAL NURSE SPECIALIST

## 2018-03-07 NOTE — CONSULTS
INFECTIOUS DISEASE CONSULT NOTE    Romulo Anitha Patient Status:  Inpatient    1940 MRN [id number]   [facility] 2NE-A Attending Adela Oleary MD   Hosp Day # 6 PCP Sinan Johnson at an [facility], has been there for 6 years. She is wheelchair bound, has not walked in years.     History:  Past Medical History:   Diagnosis Date   • Anemia    • Atherosclerosis of coronary artery    • Back problem    • Cataract    • Chronic kidney disease    • 0.25 mg, 0.25 mg, Oral, BID PRN  •  amiodarone HCl (PACERONE) tab 200 mg, 200 mg, Oral, Daily  •  aspirin EC EC tab 325 mg, 325 mg, Oral, Daily  •  atorvastatin (LIPITOR) tab 10 mg, 10 mg, Oral, Nightly  •  Calcium Carbonate-Vitamin D (OYSTER-D) 250-125 MG 341.0     Recent Labs   Lab  03/05/18   0609 03/05/18 1959 03/06/18 0627   GLU  151*   --   150*   BUN  38*   --   44*   CREATSERUM  1.62*   --   1.80*   GFRAA  35*   --   30*   GFRNAA  30*   --   26*   CA  8.1*   --   8.0*   NA  143   --   141   K other etiologies are not entirely excluded. clinical correlation recommended. CARDIAC:    Moderate to extensive coronary artery calcifications. Aortic valve prosthesis noted. Median sternotomy changes noted.  PLEURA:    Small bilateral pleural effusion MD            Us Abdomen Complete (cpt=76700)    Result Date: 3/3/2018  PROCEDURE:  US ABDOMEN COMPLETE (CPT=76700)  COMPARISON:  NAYAN CT CHEST (DSP=46399), 3/03/2018, 7:59. INDICATIONS:  Lung mass not amenable to biopsy.  Eval for liver mets amenable 12: 54     Approved by: Maame Gore MD            Us Venous Doppler Leg Bilat - Diag Img (cpt=93970)    Result Date: 3/1/2018  PROCEDURE:  US VENOUS DOPPLER LEG BILAT - DIAG IMG (CPT=93970)  COMPARISON:  None.   INDICATIONS:  asymmetric edema  TECHNIQUE: TECHNIQUE:  AP chest radiograph was obtained. COMPARISON:  [facility] , XR CHEST AP PORTABLE  (CPT=71045), 3/03/2018, 8:11. [facility] , CT CHEST (JLQ=35489), 3/03/2018, 7:59.   INDICATIONS:  dyspnea  PATIENT STATED HISTORY: (As transcribed by Technologist)  Sunni Beauchamp on 3/03/2018 at 8:26     Approved by: Eder Rawls MD            Xr Chest Ap Portable  (cpt=71045)    Result Date: 3/2/2018  PROCEDURE:  XR CHEST AP PORTABLE  (CPT=71045)  TECHNIQUE:  AP chest radiograph was obtained.   COMPARISON:  [facility] , XR CHEST AP ANA of multifocal pneumonia. This could be better characterized with CT if clinically indicated followup is recommended to ensure resolution.      Dictated by: Tita Miranda MD on 3/01/2018 at 11:29     Approved by: Tita Miranda MD             ASSESSMENT:    1. Acu

## 2018-03-07 NOTE — PROGRESS NOTES
[facility]    Progress Note    Travis Hloland Patient Status:  Inpatient    1940 MRN [id number]   [facility] 2NE-A Attending Dwayne Aj MD   Hosp Day # 6 PCP Kathy Crowder MD     Subjective:  Travis Holland is a(n) 77 y extremities ([address]     Fungal infection of the groin     Acute on chronic congestive heart failure (HCC)     Acute on chronic congestive heart failure, unspecified heart failure type (HCC)     Hypoxia     Elevated troponin     Renal insufficiency     [place]

## 2018-03-07 NOTE — PALLIATIVE CARE NOTE
PC BRITTNEY/Dimple advised PCSW that per continued goals of care discussion this morning the plan is for pt to be evaluated for [place].  Pt and pts son/Arnold requesting referral be sent to [facility] will contact pts son/Arnold t

## 2018-03-07 NOTE — PALLIATIVE CARE NOTE
1808 Reagan Alfonso Follow Up      Karlee Lyon  [id number]       Patient seen and evaluated, no family at bedside.      ROS: denies complaints     Physical Exam:  GEN:  NAD  Neuro:  Awake and alert, oriented x3  Respiratory:  Respir

## 2018-03-07 NOTE — PLAN OF CARE
Assumed care of patient around 0730. Pt a/o x 4, vapotherm 25L 40% FiO2, NSR on tele monitor. Lungs diminished. Redness noted to scooby-area, otherwise skin intact. Denies chest pain/discomfort. Plan to transition to hospice today.  Pt updated on plan of care

## 2018-03-07 NOTE — PROGRESS NOTES
[facility]  Nephrology Progress Note    Gustavo Walters Patient Status:  Inpatient    1940 MRN [id number]   [facility] 2NE-A Attending Nickolas Dickey MD   Hosp Day # 6 PCP Scooby Vazquez MD       SUBJECTIVE:  No acute events ALB, AMYLASE, LIPASE, LDH in the last 72 hours. Invalid input(s): ALPHOS, TBIL, DBIL, TPROT    No results for input(s): PGLU in the last 72 hours.     Meds:     Current Facility-Administered Medications:  Piperacillin Sod-Tazobactam So (ZOSYN) 3.375 g in carcinomatosis. Appears stable from volume standpoint although difficult to . pulm following     #3. R lung mass- enlarged when compared to previous and suspicious for primary lung cancer. No further workup given history    #4.  Anemia- multifactoria

## 2018-03-07 NOTE — PROGRESS NOTES
[facility]  Progress Note    Rosakrystlea Arm Patient Status:  Inpatient    1940 MRN [id number]   [facility] 2NE-A Attending Valentino Marquis MD   Hosp Day # 6 PCP Shilpi Green MD     CC: Shortness of breath    SUBJECTIVE:   No c There are diffuse reticular nodular opacities, with relative sparing at the left base, and most pronounced findings involving the right midlung. These are consistent with areas of interstitial and alveolar edema, whether cardiogenic or   noncardiogenic. mL Nebulization Q4H PRN   Prochlorperazine Edisylate (COMPAZINE) injection 10 mg 10 mg Intravenous Q6H PRN       ASSESSMENT / PLAN:     1.  Shortness of breath with hypoxia secondary multifocal pneumonia possible gram-negative pneumonia, COPD exacerbation,

## 2018-03-08 ENCOUNTER — APPOINTMENT (OUTPATIENT)
Dept: GENERAL RADIOLOGY | Facility: HOSPITAL | Age: 78
DRG: 177 | End: 2018-03-08
Attending: INTERNAL MEDICINE
Payer: MEDICARE

## 2018-03-08 LAB
ATRIAL RATE: 81 BPM
BAND %: 6 %
BASOPHIL % MANUAL: 0 %
BASOPHIL ABSOLUTE MANUAL: 0 X10(3) UL (ref 0–0.1)
BUN BLD-MCNC: 56 MG/DL (ref 8–20)
CALCIUM BLD-MCNC: 8.3 MG/DL (ref 8.3–10.3)
CHLORIDE: 105 MMOL/L (ref 101–111)
CO2: 28 MMOL/L (ref 22–32)
CREAT BLD-MCNC: 1.82 MG/DL (ref 0.55–1.02)
EOSINOPHIL % MANUAL: 0 %
EOSINOPHIL ABSOLUTE MANUAL: 0 X10(3) UL (ref 0–0.3)
ERYTHROCYTE [DISTWIDTH] IN BLOOD BY AUTOMATED COUNT: 19.4 % (ref 11.5–16)
GLUCOSE BLD-MCNC: 148 MG/DL (ref 70–99)
HAV IGM SER QL: 1.9 MG/DL (ref 1.7–3)
HCT VFR BLD AUTO: 31.5 % (ref 34–50)
HGB BLD-MCNC: 9.3 G/DL (ref 12–16)
LYMPHOCYTE % MANUAL: 1 %
LYMPHOCYTE ABSOLUTE MANUAL: 0.42 X10(3) UL (ref 0.9–4)
MCH RBC QN AUTO: 26.1 PG (ref 27–33.2)
MCHC RBC AUTO-ENTMCNC: 29.5 G/DL (ref 31–37)
MCV RBC AUTO: 88.5 FL (ref 81–100)
MONOCYTE % MANUAL: 5 %
MONOCYTE ABSOLUTE MANUAL: 2.1 X10(3) UL (ref 0.1–1)
MORPHOLOGY: NORMAL
NEUTROPHIL ABS PRELIM: 38.01 X10 (3) UL (ref 1.3–6.7)
NEUTROPHIL ABSOLUTE MANUAL: 39.39 X10(3) UL (ref 1.3–6.7)
NEUTROPHILS % MANUAL: 88 %
P AXIS: 41 DEGREES
P-R INTERVAL: 170 MS
PHOSPHATE SERPL-MCNC: 4.3 MG/DL (ref 2.5–4.9)
PLATELET # BLD AUTO: 346 10(3)UL (ref 150–450)
PLATELET MORPHOLOGY: NORMAL
POTASSIUM SERPL-SCNC: 4 MMOL/L (ref 3.6–5.1)
Q-T INTERVAL: 432 MS
QRS DURATION: 142 MS
QTC CALCULATION (BEZET): 501 MS
R AXIS: -44 DEGREES
RBC # BLD AUTO: 3.56 X10(6)UL (ref 3.8–5.1)
RED CELL DISTRIBUTION WIDTH-SD: 58.4 FL (ref 35.1–46.3)
SODIUM SERPL-SCNC: 141 MMOL/L (ref 136–144)
T AXIS: 30 DEGREES
TOTAL CELLS COUNTED: 100
VENTRICULAR RATE: 81 BPM
WBC # BLD AUTO: 41.9 X10(3) UL (ref 4–13)

## 2018-03-08 PROCEDURE — 99231 SBSQ HOSP IP/OBS SF/LOW 25: CPT | Performed by: CLINICAL NURSE SPECIALIST

## 2018-03-08 PROCEDURE — 99232 SBSQ HOSP IP/OBS MODERATE 35: CPT | Performed by: HOSPITALIST

## 2018-03-08 PROCEDURE — 71045 X-RAY EXAM CHEST 1 VIEW: CPT | Performed by: INTERNAL MEDICINE

## 2018-03-08 PROCEDURE — 99232 SBSQ HOSP IP/OBS MODERATE 35: CPT | Performed by: INTERNAL MEDICINE

## 2018-03-08 RX ORDER — PREDNISONE 20 MG/1
20 TABLET ORAL ONCE
Status: COMPLETED | OUTPATIENT
Start: 2018-03-08 | End: 2018-03-08

## 2018-03-08 NOTE — HOSPICE RN NOTE
I met with pt and family, discussed hospice and IPU. Pt was extremely anxious and overwhelmed, lots of tears and anger during appt.  After long discussion, she was more agreeable to [facility] with plan to return to [facility] when more comfortable and stab

## 2018-03-08 NOTE — PROGRESS NOTES
[facility]  Nephrology Progress Note    Gaurav Rolwey Patient Status:  Inpatient    1940 MRN [id number]   [facility] 2NE-A Attending Dee Dee Reynolds MD   Hosp Day # 7 PCP Susan Rouse MD       SUBJECTIVE:  No acute events, re --    --   4.3   GLU   --   150*  148*       No results for input(s): ALT, AST, ALB, AMYLASE, LIPASE, LDH in the last 72 hours. Invalid input(s): ALPHOS, TBIL, DBIL, TPROT    No results for input(s): PGLU in the last 72 hours.     Meds:     Current Faci multifactorial with pneumonia as well as enlarging mass and presumed carcinomatosis. Appears stable from volume standpoint although difficult to . pulm following     #3.   R lung mass- enlarged when compared to previous and suspicious for primary lung

## 2018-03-08 NOTE — PROGRESS NOTES
[facility] Pulmonary/Critical Care Progress Note     SUBJECTIVE/24H Events: All events, procedures, notes reviewed.  Now back to 10L HF NC.  +urine output with lasix 20mg IV x1    Review of Systems:   A comprehensive 14 109  106   --    CO2  23.0  28.0   --      Recent Labs   Lab  03/01/18   1039  03/02/18   0551  03/02/18   1714   RBC  3.00*  2.72*  3.29*   HGB  7.3*  6.7*  8.4*   HCT  24.7*  22.4*  27.5*   MCV  82.3  82.4  83.6   MCH  24.3*  24.6*  25.5*   MCHC  29.6* resolution. 2.  Redemonstration of pulmonary mass in the right middle lobe now measuring 3.7 x 4.2 cm as compared to 2.6 x 2.3 cm on the previous exam.  The findings are concerning for a progressive primary pulmonary malignancy.   Clinical correlation rec by: Oneyda Woo MD on 3/03/2018 at 8:26     Approved by: Oneyda Woo MD            Xr Chest Ap Portable  (cpt=71045)    Result Date: 3/2/2018  CONCLUSION:  No significant interval change.      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

## 2018-03-08 NOTE — PROGRESS NOTES
[facility]  Progress Note    Rin Wellington Patient Status:  Inpatient    1940 MRN [id number]   [facility] 2NE-A Attending Randal Espinosa MD   Hosp Day # 7 PCP Mario Saavedra MD     CC: Shortness of breath    SUBJECTIVE:   No o TECHNIQUE:  AP chest radiograph was obtained. COMPARISON:  EDRENA , MARGIE CHEST AP PORTABLE  (CPT=71045), 3/01/2018, 11:14.      INDICATIONS:  hypoxia     PATIENT STATED HISTORY: (As transcribed by Technologist)  Patient offered no additional history at Oral Daily with breakfast   gabapentin (NEURONTIN) tab 600 mg 600 mg Oral BID   HYDROcodone-acetaminophen (NORCO) 5-325 MG per tab 1 tablet 1 tablet Oral Q4H PRN   multivitamin (ADULT) tab 1 tablet 1 tablet Oral Daily   Pantoprazole Sodium (PROTONIX) EC ta M.D.  [facility]

## 2018-03-08 NOTE — PROGRESS NOTES
Previously saturating % on 3L nasal cannula. Had an episode of thick sputum w/ old brown blood? at approximately 0200. Oxygen saturation stated dropped but maintained >92%.  Around 0230, oxygen saturation started dropping below 90% and since that poin

## 2018-03-08 NOTE — PALLIATIVE CARE NOTE
PCSW reviewed [facility]/Brianne's note, plan is to meet with family again tonight to sign inpatient hospice consents and initiate transfer. No current PCSW needs. Unit SW/Meena paged advising of the above.

## 2018-03-08 NOTE — PROGRESS NOTES
[facility]  TEL: [phone]  FAX: [phone] KUSHAL Keating Patient Status:  Inpatient    1940 MRN [id number]   [facility] 2NE-A Attending Lili Rosario MD   [facility] Day # 7 PCP Brittany Slade Microbiology    Reviewed in EMR,     Radiology: reviewed    ASSESSMENT:     1.  Acute hypoxic resp failure with bilat infiltrates  - reports feeling better than on admission which suggests an infectious component (HCAP) superimposed on enlarging lung

## 2018-03-08 NOTE — PALLIATIVE CARE NOTE
1805 Reagan Alfonso Follow Up      Lonn Record  [id number]       Patient seen and evaluated, no family at bedside.      ROS: reports dyspnea     Physical Exam:  GEN:  NAD  Neuro:  Awake and alert, oriented x3  Respiratory:  Respirat

## 2018-03-08 NOTE — PLAN OF CARE
CARDIOVASCULAR - ADULT    • Maintains optimal cardiac output and hemodynamic stability Progressing    • Absence of cardiac arrhythmias or at baseline Progressing    Pt on tele, NSR on monitor. Daily EKG for prolonged QT, pt on PO Amiodarone.     RESPIRATORY

## 2018-03-08 NOTE — PROGRESS NOTES
[facility]  Progress Note    Mone Bryan Patient Status:  Inpatient    1940 MRN [id number]   [facility] 2NE-A Attending Benji Ahmadi MD   Hosp Day # 7 PCP Kalpesh Mitchell MD     ASSESSMENT  1.  Hypoxia - due to heatlhcare pectoris     Dyslipidemia     Anemia in chronic kidney disease     Atherosclerosis of arteries of extremities (HCC)     Fungal infection of the groin     Acute on chronic congestive heart failure (HCC)     Acute on chronic congestive heart failure, unspeci Review:    Recent Labs   03/04/18  0546 03/05/18  0609 03/06/18 0627   WBC 11.2 18.8* 19.4*   HGB 8.4* 8.2* 8.3*   .0 339.0 341.0       Recent Labs   Lab  03/05/18 1959 03/06/18 0627 03/08/18   0553   GLU   --   150*  148*   BUN   --   44*  5 1 tablet 1 tablet Oral Daily   Pantoprazole Sodium (PROTONIX) EC tab 20 mg 20 mg Oral QAM AC   ipratropium-albuterol (DUONEB) nebulizer solution 3 mL 3 mL Nebulization Q4H PRN   Prochlorperazine Edisylate (COMPAZINE) injection 10 mg 10 mg Intravenous Q6H P

## 2018-03-09 VITALS
HEART RATE: 76 BPM | SYSTOLIC BLOOD PRESSURE: 88 MMHG | BODY MASS INDEX: 22.05 KG/M2 | TEMPERATURE: 98 F | DIASTOLIC BLOOD PRESSURE: 65 MMHG | RESPIRATION RATE: 18 BRPM | WEIGHT: 112.31 LBS | HEIGHT: 60 IN | OXYGEN SATURATION: 98 %

## 2018-03-09 LAB
ATRIAL RATE: 69 BPM
HISTOPLASMA AG DETECTION,URINE: NOT DETECTED
HISTOPLASMA AG EIA, URINE: NOT DETECTED NG/ML
LEGIONELLA PNEUMOPHILA AG, URI: NEGATIVE
P AXIS: 46 DEGREES
P-R INTERVAL: 176 MS
Q-T INTERVAL: 466 MS
QRS DURATION: 136 MS
QTC CALCULATION (BEZET): 499 MS
R AXIS: -54 DEGREES
STREPTOCOCCUS PNEUMONIAE AG, U: NEGATIVE
T AXIS: 54 DEGREES
VENTRICULAR RATE: 69 BPM

## 2018-03-09 PROCEDURE — 99239 HOSP IP/OBS DSCHRG MGMT >30: CPT | Performed by: HOSPITALIST

## 2018-03-09 RX ORDER — PREDNISONE 20 MG/1
TABLET ORAL
Qty: 21 TABLET | Refills: 0 | Status: SHIPPED | OUTPATIENT
Start: 2018-03-09

## 2018-03-09 NOTE — PROGRESS NOTES
[facility]  Progress Note    Gaurav Rowley Patient Status:  Inpatient    1940 MRN [id number]   [facility] 2NE-A Attending Dee Dee Reynolds MD   Hosp Day # 8 PCP Suasn Rouse MD     CC: Shortness of breath    SUBJECTIVE:   No c left base, and most pronounced findings involving the right midlung. These are consistent with areas of interstitial and alveolar edema, whether cardiogenic or   noncardiogenic. They do not appear significantly changed.   The pulmonary vessels are largely possible gram-negative pneumonia, COPD exacerbation, acute on chronic hypoxic respiratory failure and pulmonary edema due to acute on chronic diastolic heart failure  1. pulmonary following. 2. Patient on IV antibiotics Zosyn   3.  Pro calcitonin elevated

## 2018-03-09 NOTE — PROGRESS NOTES
[address] [facility]  TEL: [phone]  FAX: [phone] KUSHAL Miles Patient Status:  Inpatient    1940 MRN [id number]   [facility] 2NE-A Attending Kalin Butcher MD   [facility] Day # 8 PCP Elva Marcelo wheelchair bound, ECF resident, no good exposure history  -better than on admission but still quite hypoxic. Completed 7 days of abx for CAP yesterday     2. Suspected HCAP- as above. Has completed treatment     3. RML mass, enlarging.  Main concern is kevyn

## 2018-03-09 NOTE — PALLIATIVE CARE NOTE
JEANNE spoke with [facility] NISH/Brianne this morning who states she met with the pt and family last night who had requested pt be transferred to the [facility] today. Hemanth MOCK/Brianne states she will be here around noon to arrange d/c.     JEANNE

## 2018-03-09 NOTE — HOSPICE RN NOTE
Pt will transfer to unit at 1215, ambulance arranged by Tahir Best. Unit given report. [facility] aware, family aware. Thank you for referral, we will take good care of [address]. Thank you!   Karen Smith [place] -[phone]

## 2018-03-10 NOTE — DISCHARGE SUMMARY
[facility]  DISCHARGE SUMMARY     Lazaro Ray Patient Status:  Inpatient    1940 MRN [id number]   [facility] 2NE-A Attending No att. providers found   Hosp Day # 8 PCP Jerzy Dee MD     Date of Admission: 3/1/2018  Da started approximately 3 weeks ago and she was treated with a course of antibiotics. She states that today she just felt like she could not bleed and therefore came to the ER for further evaluation.   She was previously being followed by pulmonary for her r (four) times daily.    Stop taking on:  3/19/2018  Quantity:  100 mL  Refills:  0        CONTINUE taking these medications      Instructions Prescription details   acetaminophen 325 MG Tabs  Commonly known as:  TYLENOL      Take 650 mg by mouth every 4 (fou OR      Take 2 tablets by mouth daily as needed.    Refills:  0           Where to Get Your Medications      Please  your prescriptions at the location directed by your doctor or nurse    Bring a paper prescription for each of these medications  · pr

## 2018-03-13 NOTE — CM/SW NOTE
03/13/18 0800   Discharge disposition   Discharged to: [facility]   Name of [address] [place] [address]

## 2019-07-15 NOTE — PLAN OF CARE
History of Present Illness





- General


Chief complaint: ENT


Stated complaint: SORE THROAT


Time Seen by Provider: 07/15/19 22:09


Source: Patient


Mode of Arrival: Ambulatory





- History of Present Illness


Initial comments: 





The patient has had 3 days of a sore throat an jack[pects it is strep. She denies

 known strep exposure. She as subjectively felt feverish and has a dry cough but

 no congestion, rashes, ha, or decreased urination.


Onset/Timing: 3


-: Days(s)


Consistency: Getting worse


Associated Symptoms: Sore throat





- Related Data


                                    Allergies











Allergy/AdvReac Type Severity Reaction Status Date / Time


 


adhesive tape Allergy  HYPERSENSIT Verified 12/30/18 14:28





   IVITY  














Travel Screening





- Travel/Exposure Within Last 30 Days


Have you traveled within the last 30 days?: No





Review of Systems


Reviewed: No additional complaints except as noted below


Constitutional: Reports: As per HPI.  Denies: Chills, Fever, Malaise, Night 

sweats, Weakness, Weight change


Eyes: Reports: As per HPI.  Denies: Eye discharge, Eye pain, Photophobia, Vision

 change


ENT: Reports: As per HPI.  Denies: Congestion, Dental pain, Ear pain, Epistaxis,

 Hearing loss, Throat pain


Respiratory: Reports: As per HPI.  Denies: Cough, Dyspnea, Hemoptysis, Stridor, 

Wheezes


Cardiovascular: Reports: As per HPI.  Denies: Arrhythmia, Chest pain, Dyspnea on

 exertion, Edema, Murmurs, Orthopnea, Palpitations, Paroxysmal nocturnal 

dyspnea, Rheumatic Fever, Syncope


Endocrine: Reports: As per HPI.  Denies: Fatigue, Heat or cold intolerance, 

Polydipsia, Polyuria


Gastrointestinal: Reports: As per HPI.  Denies: Abdominal pain, Constipation, 

Diarrhea, Hematemesis, Hematochezia, Melena, Nausea, Vomiting


Genitourinary: Reports: As per HPI.  Denies: Abnormal menses, Discharge, 

Dyspareunia, Dysuria, Frequency, Hematuria, Incontinence, Retention, Urgency


Musculoskeletal: Reports: As per HPI.  Denies: Arthralgia, Back pain, Gout, 

Joint swelling, Myalgia, Neck pain


Skin: Reports: As per HPI.  Denies: Bruising, Change in color, Change in 

hair/nails, Lesions, Pruritus, Rash


Neurological: Reports: As per HPI.  Denies: Abnormal gait, Confusion, Headache, 

Numbness, Paresthesias, Seizure, Tingling, Tremors, Vertigo, Weakness


Psychiatric: Reports: As per HPI.  Denies: Anxiety, Auditory hallucinations, 

Depression, Homicidal thoughts, Suicidal thoughts, Visual hallucinations


Hematological/Lymphatic: Reports: As per HPI.  Denies: Anemia, Blood Clots, Easy

 bleeding, Easy bruising, Swollen glands





Past Medical History





- SOCIAL HISTORY


Smoking Status: Never smoker


Alcohol Use: None


Drug Use: None





- RESPIRATORY


Hx Respiratory Disorders: Yes


Hx Asthma: Yes (borderline)





- CARDIOVASCULAR


Hx Cardio Disorders: Yes


Hx Hypertension: Yes





- NEURO


Hx Neuro Disorders: No





- GI


Hx GI Disorders: No





- 


Hx Genitourinary Disorders: No





- ENDOCRINE


Hx Endocrine Disorders: No





- MUSCULOSKELETAL


Hx Musculoskeletal Disorders: Yes


Comment:: "hip problems"





- PSYCH


Hx Psych Problems: Yes


Hx Anxiety: Yes





- HEMATOLOGY/ONCOLOGY


Hx Hematology/Oncology Disorders: No





Family Medical History


Any Significant Family History?: Yes


Family Hx Comment (NOT TO BE USED IN PLACE OF ITEMS BELOW): maternal 

grandfather-prostate ca


Hx Cancer: Grandparents





Physical Exam





- General


General Appearance: Alert, Oriented x3, Cooperative, No acute distress





- Head


Head exam: Normal inspection





- Eye


Eye exam: Normal appearance, PERRL


Pupils: Normal accommodation





- ENT


ENT exam: Normal exam, Mucous membranes moist, Normal external ear exam, Normal 

orophraynx, TM's normal bilaterally


Ear exam: Normal external inspection.  negative: External canal tenderness


Nasal Exam: Normal inspection.  negative: Discharge, Sinus tenderness


Mouth exam: Normal external inspection, Tongue normal


Teeth exam: Normal inspection.  negative: Dental caries


Throat exam: Normal inspection, Tonsillar erythema.  negative: Tonsillomegaly, 

Tonsillar exudate





- Neck


Neck exam: Normal inspection, Full ROM.  negative: Lymphadenopathy, Meningismus,

 Tenderness





- Respiratory


Respiratory exam: Normal lung sounds bilaterally.  negative: Respiratory 

distress





- Cardiovascular


Cardiovascular Exam: Regular rate, Normal rhythm, Normal heart sounds





- GI/Abdominal


GI/Abdominal exam: Soft, Normal bowel sounds.  negative: Tenderness





- Rectal


Rectal exam: Deferred





- 


 exam: Deferred





- Extremities


Extremities exam: Normal inspection, Full ROM, Normal capillary refill.  

negative: Tenderness





- Back


Back exam: Reports: Normal inspection, Full ROM.  Denies: Muscle spasm, Rash 

noted, Tenderness





- Neurological


Neurological exam: Alert, Normal gait, Oriented X3, Reflexes normal





- Psychiatric


Psychiatric exam: Normal affect, Normal mood





- Skin


Skin exam: Dry, Intact, Normal color, Warm





Course





                                   Vital Signs











  07/15/19





  22:02


 


Temperature 98.4 F


 


Pulse Rate [ 92 H





Left] 


 


Respiratory 16





Rate 


 


Blood Pressure 152/120





[Left Arm] 


 


Pulse Ox 99














- Reevaluation(s)


Reevaluation #1: 


Results discussed wit patient. All questions answered.  Ready for Dc.


07/15/19 22:27








Medical Decision Making





- Management Options


MDM Management: No Additional Work-up Planned





- Data Complexity


MDM Data: Labs Ordered and/or Reviewed (Rapid strep negative)





Disposition


Disposition: Discharge


Clinical Impression: 


Pharyngitis


Qualifiers:


 Pharyngitis/tonsillitis etiology: unspecified etiology Qualified Code(s): J02.9

- Acute pharyngitis, unspecified





Disposition: Home, Self-Care


Condition: (1) Good


Instructions:  Pharyngitis (ED), Tonsillitis (ED)


Additional Instructions: 


HOme rest.


Push fluids.


Tylenol alternated with ibuprofen as needed as directed for pain.


Thoat sprays, lozenges daytime. May gargle with liquid benadryl up to 50 mg at 

night for sore throat and to aide in sleep.


PCP follow up as needed.





Quality





- Quality Measures


Quality Measures: N/A





- Blood Pressure Screening


Does Patient Have Any of the Following: No


Blood Pressure Classification: Hypertensive Reading


Systolic Measurement: 152


Diastolic Measurement: 120


Screening for High Blood Pressure: < Pre-Hypertensive BP, F/U Documented > 

[]


Pre-Hypertensive Follow-up Interventions: Follow-up with rescreen every year. Problem: RESPIRATORY - ADULT  Goal: Achieves optimal ventilation and oxygenation  INTERVENTIONS:  - Assess for changes in respiratory status  - Assess for changes in mentation and behavior  - Position to facilitate oxygenation and minimize respiratory effo

## 2019-11-29 NOTE — PROGRESS NOTES
[facility]  Progress Note    Ann Ricardo Patient Status:  Inpatient    1940 MRN [id number]   [facility] 2NE-A Attending Melissa Prasad MD   Hosp Day # 4 PCP Larry Baer MD     CC: Shortness of breath    SUBJECTIVE: Cynthiaen Date   WBC 18.8 03/05/2018   HGB 8.2 03/05/2018   HCT 26.9 03/05/2018   .0 03/05/2018   CREATSERUM 1.62 03/05/2018   BUN 38 03/05/2018    03/05/2018   K 3.4 03/05/2018    03/05/2018   CO2 29.0 03/05/2018    03/05/2018   CA 8.1 03/ Oral Nightly   Calcium Carbonate-Vitamin D (OYSTER-D) 250-125 MG-UNIT per tab 2 tablet 2 tablet Oral BID   cholestyramine light (PREVALITE) powder packet 4 g 4 g Oral BID   ferrous sulfate EC tab 325 mg 325 mg Oral Daily with breakfast   gabapentin (NEURON but she declines per pulmonary  10. Hypokalemia–replace          Quality:  · DVT Prophylaxis: SCD  · CODE status: DNR  · Hussein: no  · Central line: no    Estimated date of discharge:  To be decided  Discharge is dependent on: Clinical progress, off Vapother negative...

## 2022-11-07 NOTE — PLAN OF CARE
Multilayer Compression Wrap   (Not Unna) Below the Knee    NAME:  Laureano Arevalo. YOB: 1953  MEDICAL RECORD NUMBER:  [id number]  DATE:  11/7/2022       Removed old Multilayer wrap if present and washed leg with mild soap/water. Applied moisturizing agent to dry skin as needed. Applied primary and secondary dressing as ordered    Applied multilayered dressing below the knee to Right lower leg(s)  (2 Layer compression wrap ) . Instructed patient/caregiver not to remove dressing and to keep it clean and dry. Instructed patient/caregiver on complications to report to provider, such as pain, numbness in toes, heavy drainage, and slippage of dressing. Instructed patient on purpose of compression dressing and on activity and exercise recommendations.    Applied per   Guidelines    Electronically signed by Lucia Logan RN on 11/7/2022 at 3:14 PM Patient refusing to get up today. Patient requiring 15 lnc fot maintain sats. Will continue to monitor . Blood completed will recheck hgb 1700.

## 2024-11-26 NOTE — PLAN OF CARE
PULMONOLOGY ON CONSULT FOR RML MASS FOUND ON CTA UA order has been placed. Please help patient make an appointment for further evaluation.

## (undated) DEVICE — INTENDED TO BE USED TO OCCLUDE, RETRACT AND IDENTIFY ARTERIES, VEINS, TENDONS AND NERVES IN SURGICAL PROCEDURES: Brand: STERION®  VESSEL LOOP

## (undated) DEVICE — TOWEL: OR BLU 80/CS: Brand: MEDICAL ACTION INDUSTRIES

## (undated) DEVICE — GLOVE BIOGEL M SURG SZ 8

## (undated) DEVICE — SUTURE VICRYL 3-0 PS-1

## (undated) DEVICE — 3M(TM) TEGADERM(TM) TRANSPARENT FILM DRESSING FRAME STYLE 1628: Brand: 3M™ TEGADERM™

## (undated) DEVICE — TRANSPOSAL ULTRAFLEX DUO/QUAD ULTRA CART MANIFOLD

## (undated) DEVICE — SUTURE PROLENE 6-0 C-1

## (undated) DEVICE — CONVERTORS STOCKINETTE: Brand: CONVERTORS

## (undated) DEVICE — GLOVE SURG TRIUMPH SZ 8

## (undated) DEVICE — 1 ML TUBERCULIN SYRINGE REGULAR TIP: Brand: MONOJECT

## (undated) DEVICE — DECANTER BAG 9": Brand: MEDLINE INDUSTRIES, INC.

## (undated) DEVICE — SOL  .9 500ML

## (undated) DEVICE — MEDI-VAC SUCTION FINE CAPACITY: Brand: CARDINAL HEALTH

## (undated) DEVICE — 3M™ TEGADERM™ TRANSPARENT FILM DRESSING, 1626W, 4 IN X 4-3/4 IN (10 CM X 12 CM), 50 EACH/CARTON, 4 CARTON/CASE: Brand: 3M™ TEGADERM™

## (undated) DEVICE — DRAPE WARMER ORS-100

## (undated) DEVICE — SUTURE VICRYL 2-0 CT-1

## (undated) DEVICE — SUTURE ETHIBOND EXCEL 3-0 SH

## (undated) DEVICE — 3M™ IOBAN™ 2 ANTIMICROBIAL INCISE DRAPE 6651EZ: Brand: IOBAN™ 2

## (undated) DEVICE — GOWN SURG AERO CHROME XXL

## (undated) DEVICE — SUTURE POLYDEK 4-0 TIES 6-932

## (undated) DEVICE — HEMOCLIP MED 24 CLIP/CARTRIDGE

## (undated) DEVICE — SOL  .9 1000ML BTL

## (undated) DEVICE — BASIC DOUBLE BASIN 1-LF: Brand: MEDLINE INDUSTRIES, INC.

## (undated) DEVICE — Device

## (undated) DEVICE — UNDYED BRAIDED (POLYGLACTIN 910), SYNTHETIC ABSORBABLE SUTURE: Brand: COATED VICRYL

## (undated) DEVICE — GEL AQUASONIC 100 20GR

## (undated) DEVICE — HEMOCLIP HORIZON SM MULTI

## (undated) DEVICE — SUTURE PROLENE 7-0 CC

## (undated) DEVICE — CHLORAPREP ORANGE TINT 10.5ML

## (undated) DEVICE — DERMABOND LIQUID ADHESIVE

## (undated) DEVICE — GLOVE SURG TRIUMPH SZ 7

## (undated) DEVICE — GAUZE SPONGES,12 PLY: Brand: CURITY

## (undated) DEVICE — GLOVE SURG TRIUMPH SZ 71/2

## (undated) DEVICE — CV PACK-LF: Brand: MEDLINE INDUSTRIES, INC.

## (undated) DEVICE — SUTURE PROLENE 5-0 C-1

## (undated) NOTE — LETTER
[facility] [address], [address]  Consent for Procedure/Sedation    Date:       Time:        1.  I authorize the performance upon Luis E Valverde the following: Peripheral angiography, atherectomy, percutaneous transluminal angiop you may develop a skin reaction.       Signature of Patient: _______________________________________________________    Signature of person authorized                                           Relationship to  to consent for patient: _______________________

## (undated) NOTE — LETTER
[address] [facility] FOR BLOOD OR BLOOD COMPONENTS      In the course of your treatment, it may become necessary to administer a transfusion of blood or blood components.  This form provides basic information concerning this proc explain the alternatives to you if it has not already been done. I,Betty Marti, have read/had read to me the above. I understand the matters bearing on the decision whether or not to authorize a transfusion of blood or blood components.  I have no ques

## (undated) NOTE — IP AVS SNAPSHOT
[address]            (For Outpatient Use Only) Initial Admit Date: 10/26/2017   Inpt/Obs Admit Date: Inpt: 10/26/17 / Obs: N/A   Discharge Date:    Fausto Tadeo:  [de-identified]   MRN: [de-identified]   CSN: [id number]        [facility] CRISTINA Subscriber ID:  Pt Rel to Subscriber:    Hospital Account Financial Class: Medicare    November 5, 2017

## (undated) NOTE — IP AVS SNAPSHOT
Patient Demographics     Address  [address] [place] [address] Dr Erika Vasquez [address] Phone  [phone] (Home) *Preferred*  [phone] [facility])      Emergency Contact(s)     Name Relation Home Work Nicolas Ennis [phone]    [facility] Take 10 mg by mouth nightly. cholestyramine light 4 g Pack  Commonly known as:  PREVALITE  Next dose due: This evening 1/10      Take by mouth 2 (two) times daily.           Clopidogrel Bisulfate 75 MG Tabs  Commonly known as:  PLAVIX  Next dose Where to Get Your Medications      Please  your prescriptions at the location directed by your doctor or nurse    Bring a paper prescription for each of these medications  Clopidogrel Bisulfate 75 MG Tabs  Pantoprazole Sodium 20 MG Tbe Vitals  115/75 Filed at 01/10/2018 0758   Pulse  58 Filed at 01/10/2018 0758   Resp  18 Filed at 01/10/2018 0758   Temp  98.7 °F (37.1 °C) Filed at 01/10/2018 0758   SpO2  98 % Filed at 01/10/2018 0758         Lab Results Last 24 Hours      BASIC METABOLIC Filed:  2018  4:24 PM Date of Service:  2018  4:10 PM Status:  Signed    :  Vicki Velázquez MD (Physician)         [facility]  History and Physical     Rosie Alcocer Patient Status:  Outpatient in a Bed    1940 MRN [id number] metoprolol Tartrate 25 MG Oral Tab Take 0.5 tablets (12.5 mg total) by mouth 2x Daily(Beta Blocker). Disp: 30 tablet Rfl: 0   aspirin 81 MG Oral Tab Take 81 mg by mouth daily.  Disp:  Rfl:    ferrous sulfate 325 (65 FE) MG Oral Tab EC Take 325 mg by mouth d Cardiovascular: S1, S2. Regular rate and rhythm. R groind TTP, + ecchymosis  Chest and Back: No tenderness or deformity. Abdomen: Soft, nontender, nondistended. Positive bowel sounds. No rebound, guarding or organomegaly.   Neurologic: No focal neurologic No notes of this type exist for this encounter. Video Swallow Study Notes     No notes of this type exist for this encounter. SLP Notes     No notes of this type exist for this encounter.

## (undated) NOTE — IP AVS SNAPSHOT
Patient Demographics     Address  [address] [place] [address] Dr Braden Perdue [address] Phone  [phone] (Home) *Preferred*  [phone] [facility])      Emergency Contact(s)     Name Relation Home Work Nicolas Ennis [phone]    [facility] Eleazar Ko DPM.    Specialty:  PODIATRIST  Why:  As needed after follow up with vascular doctor.  Podiatrist in the hospital  Contact information:  [address] [facility] [address]             Keith Ang MD. Take 1 tablet by mouth every 8 (eight) hours as needed for Pain. Valerie Torrez MD         metoprolol Tartrate 25 MG Tabs  Commonly known as:  LOPRESSOR  Next dose due:  11/5 pm      Take 0.5 tablets (12.5 mg total) by mouth 2x Daily(Beta Blocker).    An [id number] Pentoxifylline ER (TRENTAL) CR tab 400 mg 11/05/17 1027 Given      [id number] Potassium Chloride ER (K-DUR M20) CR tab 40 mEq 11/04/17 1546 Given      [id number] Zolpidem Tartrate (AMBIEN) tab 5 mg 11/04/17 2055 Given      [id number] amiodarone HCl PTT 27.3 25.0 - 34.0 seconds [facility]            POTASSIUM [[id number]] (Normal)  Resulted: 11/05/17 0551, Result status: Final result   Ordering provider:  Lucy Canchola MD  11/04/17 2300 Resulting lab:  [facility]    Specimen Information    Type Karen Potassium 3.7 3.6 - 5.1 mmol/L [facility]   Chloride 104 101 - 111 mmol/L — [facility]   CO2 28.0 22.0 - 32.0 mmol/L — [facility]            CBC, PLATELET; NO DIFFERENTIAL [[id number]] (Abnormal)  Resulted: 11/04/17 1311, Result status: Final result   Katrin Melgar H&P signed by Nikolay Borden MD at 10/26/2017  4:57 PM  Version 3 of 3    Author:  Nikolay Borden MD Service:  Hospitalist Author Type:  Physician    Filed:  10/26/2017  4:57 PM Date of Service:  10/26/2017  3:20 PM Status:  Addendum    :  Miko Duran No date: CABG    Family history:  Family History   Problem Relation Age of Onset   • Heart Disorder Father    • Heart Disorder Mother    • Cancer Mother       Reviewed    Social history:   reports that she has quit smoking.  She has never used smokeless tob Fluticasone Propionate 50 MCG/ACT Nasal Suspension by Each Nare route daily as needed for Rhinitis. Disp:  Rfl:     Sennosides-Docusate Sodium (SENEXON-S OR) Take 2 tablets by mouth daily as needed.  Disp:  Rfl:     AmLODIPine Besylate 10 MG Oral Tab Take 1 BUN 20 10/26/2017    10/26/2017   K 4.4 10/26/2017    10/26/2017   CO2 11.0 10/26/2017    10/26/2017   CA 8.6 10/26/2017   ALB 2.8 10/26/2017   ALKPHO 103 10/26/2017   BILT 0.2 10/26/2017   TP 7.5 10/26/2017   AST 21 10/26/2017   ALT 15 CONCLUSION:  Severe peripheral vascular disease within the right lower extremity. No pulse is detected in the right toe. The posterior tibial artery and proximal popliteal artery are occluded.  Monophasic waveforms are seen within the right common femoral now because of severe peripheral vascular disease, follow blood pressure closely  5. DJD  6.  Chronic kidney disease stage III with a GFR of 30[MJ.3]      Quality:  · DVT Prophylaxis:[MJ.1] heparin[MJ.3]  · CODE status:[MJ.1] DNR[MJ.3] from before[MJ.4] per [facility]                                                               History & Physical         Rin Wellington Patient Status:  Inpatient    1940 MRN [id number]   [facility] 3NW-A Attending Randal Espinosa MD   [facility] Day # 0 Enalapril                 Lisinopril                Plavix [Clopidogrel]        Home Medications:    Prescriptions Prior to Admission:  Pravastatin Sodium 10 MG Oral Tab Take 10 mg by mouth nightly.  Disp:  Rfl:     aspirin 81 MG Oral Tab Take 81 mg by mout positives and negatives noted in the the HPI. Physical Exam:     Vital signs: Blood pressure 95/75, pulse 69, temperature (!) 97.5 °F (36.4 °C), temperature source Temporal, resp. rate 16, height 4' 8\" (1.422 m), weight 117 lb (53.1 kg), SpO2 95 %.   Ge INDICATIONS:  swelling. No pulses detected in the right foot.         TECHNIQUE:  A comprehensive color duplex Doppler ultrasound examination of the right lower extremity was performed as well as high-resolution ultrasound examination of the abdominal aorta Dictated by: Patricia David MD on 10/26/2017 at 13:09       XR FOOT, COMPLETE (MIN 3 VIEWS), RIGHT (CPT=73630)     TECHNIQUE:  AP, oblique, and lateral views were obtained. COMPARISON:  None.      INDICATIONS:  swelling     PATIENT STATED HISTORY: (A Discussed with ER Physician.   Discussed in detail with patient       **Certification      PHYSICIAN Certification of Need for Inpatient Hospitalization -[QG.7] Initial Certification[MJ.3]    Patient will require inpatient services that will reasonably be had had the symptoms for the last 2-3 weeks and patient states she did not tell anybody earlier because she thought the pain was going to get better with time today the pain became worse 8 out of 10 constant pain, aggravated with palpation or bearing weigh omeprazole 20 MG Oral Capsule Delayed Release Take 20 mg by mouth every morning before breakfast. Disp:  Rfl:     furosemide 20 MG Oral Tab Take 10 mg by mouth every other day.  Disp:  Rfl:     atenolol 50 MG Oral Tab Take 50 mg by mouth 2 (two) times daily Abdomen: Soft, nontender, nondistended. Positive bowel sounds. No rebound tenderness  Neurologic: No focal neurological deficits.   Musculoskeletal:[MJ.1] Right foot fifth toe wet gangrene with surrounding cellulitis of the right foot with erythema and mil RIGHT EXTREMITY:  Monophasic waveforms are seen within the right common femoral artery through the mid right superficial femoral artery. The most distal aspect of the right superficial femoral artery and proximal right popliteal artery are occluded.    Alexandra Bowman FINDINGS:  Moderate degenerative changes seen at the right second MTP joint. Patchy nonspecific sclerosis is seen throughout the foot. There is soft tissue swelling seen within the hindfoot and forefoot. No acute displaced osseous fracture.  If there is a Attribution Mitchell    MJ.1 - Laura Casper MD on 10/26/2017  3:20 PM  MJ.2 - Laura Casper MD on 10/26/2017  4:03 PM  ADAM.3 - Laura Casper MD on 10/26/2017  4:37 PM                        Consults - MD Consult Notes      Consults signed by Nathan Gonzáles, gabapentin, Protonix, Pravachol. She has been on Ancef. Her notes also state she has been on Lopressor, Trental.  She has also been taking hydralazine for blood pressure control. ALLERGIES:  Diovan.      SOCIAL HISTORY:  The patient states she does not of the aorta with marked tortuosity with severe disease in both common iliac arteries, possibly femoral arteries.   The patient's CT angiogram read as a 50% stenosis of the right common iliac artery at its origin, but it appears to be greater than that; 60% the gangrene worsens she could end up losing her leg above the knee.   She is aware of the risks and complications of any type of surgery, with death, cardiac complication, bleeding, infection, graft thrombosis, limb loss, future graft thrombosis, future li

## (undated) NOTE — IP AVS SNAPSHOT
[address]            (For Outpatient Use Only) Initial Admit Date: 1/9/2018   Inpt/Obs Admit Date: Inpt: N/A / Obs: N/A   Discharge Date:    Hospital Acct:  [de-identified]   MRN: [de-identified]   CSN: [id number]        [facility] Group Number:  Insurance Type:    Subscriber Name:  Subscriber :    Subscriber ID:  Pt Rel to Subscriber:    Hospital Account Financial Class: Medicare    January 10, 2018

## (undated) NOTE — IP AVS SNAPSHOT
[address]            (For Outpatient Use Only) Initial Admit Date: 3/1/2018   Inpt/Obs Admit Date: Inpt: 3/1/18 / Obs: N/A   Discharge Date:    Rustam Ruiz:  [de-identified]   MRN: [de-identified]   CSN: [id number]        ENCOUNTER  Patient C Subscriber Name:  Delicia Alvarenga :    Subscriber ID:  Pt Rel to Subscriber:    Hospital Account Financial Class: Medicare    2018

## (undated) NOTE — LETTER
[facility]  Dot Carmen [address] [facility], [address] [place] [address]    Consent for Operation    Date: __________________    Time: _______________    1.  I authorize the performance upon Gustavo Walters the following operation:      right ilio - femoral endarterecto videotape. [facility] will not be responsible for storage or maintenance of this tape. 6. For the purpose of advancing medical education, I consent to the admittance of observers to the Operating Room.     7. I authorize the use of any specimen, organs Signature of Patient:   ___________________________    When the patient is a minor or mentally incompetent to give consent:  Signature of person authorized to consent for patient: ___________________________   Relationship to patient: _____________________ drugs/illegal medications). Failure to inform my anesthesiologist about these medicines may increase my risk of anesthetic complications. · If I am allergic to anything or have had a reaction to anesthesia before.     3. I understand how the anesthesia med I have discussed the procedure and information above with the patient (or patient’s representative) and answered their questions. The patient or their representative has agreed to have anesthesia services.     _______________________________________________

## (undated) NOTE — IP AVS SNAPSHOT
Patient Demographics     Address  [address] [place] [address] Dr Austin Sensor [address] Phone  [phone] [facility]) *Preferred*  [phone] [facility])      Emergency Contact(s)     Name Relation Home Work Nicolas Ennis [phone]    [facility] HYDROcodone-acetaminophen 5-325 MG Tabs  Commonly known as:  NORCO      Take 1 tablet by mouth every 4 (four) hours.           ipratropium-albuterol 0.5-2.5 (3) MG/3ML Soln  Commonly known as:  DUONEB      Take 3 mL by nebulization 4 (four) times da [id number] atorvastatin (LIPITOR) tab 10 mg 03/08/18 2127 Given      [id number] ferrous sulfate EC tab 325 mg 03/09/18 0825 Given      [id number] folic acid (FOLVITE) tab 1 mg 03/09/18 0825 Given      [id number] gabapentin (NEURONTIN) tab 600 mg 03/08/18 2128 C. Difficile(Toxigenic) By Pcr Positive for Toxigenic C. difficile (A)    Blood Culture FREQ X 2 [[id number]] Collected:  03/01/18 1158    Order Status:  Completed Lab Status:  Final result Updated:  03/06/18 1300    Specimen:  Blood from Blood,peripheral Author:  Malik Larios MD Service:  Hospitalist Author Type:  Physician    Filed:  3/1/2018  3:32 PM Date of Service:  3/1/2018  3:23 PM Status:  Signed    :  Malik Larios MD (Physician)         [facility]  History and Physical     Sonia Ochoa Past Surgical History: Past Surgical History:  No date: CABG  No date: CATARACT    Social History:  reports that she has quit smoking. She has never used smokeless tobacco. She reports that she drinks alcohol. She reports that she does not use drugs.     Felix Sennosides-Docusate Sodium (SENEXON-S OR) Take 2 tablets by mouth daily as needed. Disp:  Rfl:        Review of Systems:   A comprehensive 14 point review of systems was completed. Pertinent positives and negatives noted in the HPI.     Physical Exam: 2. Pulmonary consult  3. Check procalcitonin  4. oxygen  2. Multifocal pneumonia  1. Empiric Antibiotics  2. Check RVP  3. Elevated troponin  1. Trend  2. ECHO  3. Cardiology consulted  4. Leukocytosis  1. Due to pneumonia  5. Anemia  1. monitor  6.  PAD  7 and endarterectomy, RML nodule and PCKD/CKD, admitted on 3/1 with worsening SOB. Pt denies any fever, chills or cough on admission. No CP either.  Symptoms had been going on for about 3 weeks and had tried abx w/o any improvement so was eventually sent to t • Hx of CABG    • Hyperlipidemia    • Peripheral vascular disease ([facility] [address]    • Pneumonia    • Pneumonia due to organism    • Polycystic kidney disease    • PVD (peripheral vascular disease) ([facility])    • Shortness of breath      Past Surgical History:  No date: •  gabapentin (NEURONTIN) tab 600 mg, 600 mg, Oral, BID  •  HYDROcodone-acetaminophen (NORCO) 5-325 MG per tab 1 tablet, 1 tablet, Oral, Q4H PRN  •  multivitamin (ADULT) tab 1 tablet, 1 tablet, Oral, Daily  •  Pantoprazole Sodium (PROTONIX) EC tab 20 mg, 2 PROCEDURE:  CT CHEST (CPT=71250)  COMPARISON:  [facility] , CT CHEST (KSR=49400), 11/01/2017, 10:47. INDICATIONS:  hypoxia; known RML nodule  TECHNIQUE:  Unenhanced multislice CT scanning is performed through the chest.  Dose reduction techniques were used.  D Changes related to polycystic kidney disease bilaterally. The partially imaged kidneys are enlarged with a few scattered nonspecific calcifications noted. BONES:    Degenerative changes in the spine in shoulders. Dextroscoliosis of the spine.   Median allie evaluate the abdomen. The exam includes images of the liver, gallbladder, common bile duct, pancreas, spleen, kidneys, IVC, and aorta.   PATIENT STATED HISTORY: (As transcribed by Technologist)  Patient to be evaluated for liver mets and she is unable to h venous system. B-mode two-dimensional images of the vascular structures, Doppler spectral analysis, and color flow. Doppler imaging were performed.   The following veins were imaged bilaterally:  Common, deep, and superficial femoral, popliteal, sapheno-fe FINDINGS:  Stable cardiac size. Bilateral interstitial and alveolar opacities are again noted which have improved since prior examination. No pleural effusions. No pneumothorax. Atheromatous changes of the aorta.   Postoperative changes of median sterno (CPT=71045), 3/01/2018, 11:14. INDICATIONS:  hypoxia  PATIENT STATED HISTORY: (As transcribed by Technologist)  Patient offered no additional history at this time.     FINDINGS:  There are diffuse reticular nodular opacities, with relative sparing at the l 1. Acute hypoxic resp failure with bilat infiltrates  - reports feeling better than on admission which suggests an infectious component (HCAP) superimposed on enlarging lung mass.  Alternatively, abnl CXR could just represent lung ca with lymphangitic sprea No notes of this type exist for this encounter. Occupational Therapy Notes (last 72 hours) (Notes from 3/6/2018 12:19 PM through 3/9/2018 12:19 PM)     No notes of this type exist for this encounter.       Video Swallow Study Notes     No notes of thi